# Patient Record
Sex: FEMALE | Race: OTHER | Employment: UNEMPLOYED | URBAN - METROPOLITAN AREA
[De-identification: names, ages, dates, MRNs, and addresses within clinical notes are randomized per-mention and may not be internally consistent; named-entity substitution may affect disease eponyms.]

---

## 2017-01-03 ENCOUNTER — HOSPITAL ENCOUNTER (OUTPATIENT)
Dept: ULTRASOUND IMAGING | Age: 42
Discharge: HOME | End: 2017-01-03

## 2017-01-03 DIAGNOSIS — Z34.82 PRENATAL CARE, SUBSEQUENT PREGNANCY, SECOND TRIMESTER: ICD-10-CM

## 2017-01-05 ENCOUNTER — ROUTINE PRENATAL (OUTPATIENT)
Dept: OBGYN CLINIC | Age: 42
End: 2017-01-05

## 2017-01-05 VITALS
HEART RATE: 75 BPM | SYSTOLIC BLOOD PRESSURE: 122 MMHG | WEIGHT: 126 LBS | BODY MASS INDEX: 23.05 KG/M2 | DIASTOLIC BLOOD PRESSURE: 66 MMHG

## 2017-01-05 DIAGNOSIS — O09.30 LATE PRENATAL CARE: ICD-10-CM

## 2017-01-05 DIAGNOSIS — Z34.83 PRENATAL CARE, SUBSEQUENT PREGNANCY, THIRD TRIMESTER: Primary | ICD-10-CM

## 2017-01-05 PROCEDURE — 99212 OFFICE O/P EST SF 10 MIN: CPT | Performed by: OBSTETRICS & GYNECOLOGY

## 2017-01-10 ENCOUNTER — HOSPITAL ENCOUNTER (OUTPATIENT)
Dept: OTHER | Age: 42
Discharge: HOME | End: 2017-01-10
Attending: OBSTETRICS & GYNECOLOGY | Admitting: OBSTETRICS & GYNECOLOGY

## 2017-01-10 DIAGNOSIS — O99.810 ABNORMAL GLUCOSE COMPLICATING PREGNANCY: ICD-10-CM

## 2017-01-12 ENCOUNTER — ROUTINE PRENATAL (OUTPATIENT)
Dept: OBGYN CLINIC | Age: 42
End: 2017-01-12

## 2017-01-12 VITALS
WEIGHT: 132 LBS | HEART RATE: 72 BPM | BODY MASS INDEX: 24.14 KG/M2 | DIASTOLIC BLOOD PRESSURE: 83 MMHG | SYSTOLIC BLOOD PRESSURE: 134 MMHG

## 2017-01-12 DIAGNOSIS — Z34.83 PRENATAL CARE, SUBSEQUENT PREGNANCY, THIRD TRIMESTER: Primary | ICD-10-CM

## 2017-01-12 DIAGNOSIS — Z64.1 GRAND MULTIPARITY: ICD-10-CM

## 2017-01-12 PROCEDURE — 90471 IMMUNIZATION ADMIN: CPT | Performed by: OBSTETRICS & GYNECOLOGY

## 2017-01-12 PROCEDURE — 90715 TDAP VACCINE 7 YRS/> IM: CPT | Performed by: OBSTETRICS & GYNECOLOGY

## 2017-01-12 PROCEDURE — 99212 OFFICE O/P EST SF 10 MIN: CPT | Performed by: OBSTETRICS & GYNECOLOGY

## 2017-01-12 RX ORDER — LANOLIN ALCOHOL/MO/W.PET/CERES
325 CREAM (GRAM) TOPICAL 2 TIMES DAILY
Qty: 90 TABLET | Refills: 3 | Status: SHIPPED | OUTPATIENT
Start: 2017-01-12 | End: 2018-02-09 | Stop reason: SDUPTHER

## 2017-01-15 LAB — GROUP B STREP CULTURE: NORMAL

## 2017-01-28 PROBLEM — O47.9 THREATENED LABOR AT TERM: Status: ACTIVE | Noted: 2017-01-28

## 2017-12-29 ENCOUNTER — INITIAL PRENATAL (OUTPATIENT)
Dept: OBGYN CLINIC | Age: 42
End: 2017-12-29

## 2017-12-29 ENCOUNTER — HOSPITAL ENCOUNTER (OUTPATIENT)
Dept: OTHER | Age: 42
Discharge: HOME | End: 2017-12-31

## 2017-12-29 VITALS
SYSTOLIC BLOOD PRESSURE: 106 MMHG | DIASTOLIC BLOOD PRESSURE: 65 MMHG | HEART RATE: 84 BPM | WEIGHT: 117.8 LBS | BODY MASS INDEX: 21.55 KG/M2

## 2017-12-29 DIAGNOSIS — Z34.82 PRENATAL CARE, SUBSEQUENT PREGNANCY, SECOND TRIMESTER: Primary | ICD-10-CM

## 2017-12-29 DIAGNOSIS — O09.32 LATE PRENATAL CARE AFFECTING PREGNANCY IN SECOND TRIMESTER: ICD-10-CM

## 2017-12-29 PROBLEM — O47.9 THREATENED LABOR AT TERM: Status: RESOLVED | Noted: 2017-01-28 | Resolved: 2017-12-29

## 2017-12-29 LAB
ABO/RH: NORMAL
ANTIBODY SCREEN: NORMAL
HEPATITIS C ANTIBODY INTERPRETATION: NORMAL

## 2017-12-29 PROCEDURE — 90471 IMMUNIZATION ADMIN: CPT | Performed by: OBSTETRICS & GYNECOLOGY

## 2017-12-29 PROCEDURE — 90674 CCIIV4 VAC NO PRSV 0.5 ML IM: CPT | Performed by: OBSTETRICS & GYNECOLOGY

## 2017-12-29 PROCEDURE — 99215 OFFICE O/P EST HI 40 MIN: CPT | Performed by: OBSTETRICS & GYNECOLOGY

## 2017-12-29 RX ORDER — VITAMIN A, VITAMIN C, VITAMIN D-3, VITAMIN E, VITAMIN B-1, VITAMIN B-2, NIACIN, VITAMIN B-6, CALCIUM, IRON, ZINC, COPPER 4000; 120; 400; 22; 1.84; 3; 20; 10; 1; 12; 200; 27; 25; 2 [IU]/1; MG/1; [IU]/1; MG/1; MG/1; MG/1; MG/1; MG/1; MG/1; UG/1; MG/1; MG/1; MG/1; MG/1
1 TABLET ORAL DAILY
Qty: 30 TABLET | Refills: 11 | Status: ON HOLD | OUTPATIENT
Start: 2017-12-29 | End: 2018-04-18

## 2017-12-30 LAB
BASOPHILS ABSOLUTE: 0 K/UL (ref 0–0.2)
BASOPHILS RELATIVE PERCENT: 0.3 %
EOSINOPHILS ABSOLUTE: 0 K/UL (ref 0–0.6)
EOSINOPHILS RELATIVE PERCENT: 0.2 %
HCT VFR BLD CALC: 30.6 % (ref 36–48)
HEMOGLOBIN: 9.8 G/DL (ref 12–16)
HEPATITIS B SURFACE ANTIGEN INTERPRETATION: ABNORMAL
LYMPHOCYTES ABSOLUTE: 1.8 K/UL (ref 1–5.1)
LYMPHOCYTES RELATIVE PERCENT: 16.5 %
MCH RBC QN AUTO: 27.1 PG (ref 26–34)
MCHC RBC AUTO-ENTMCNC: 32 G/DL (ref 31–36)
MCV RBC AUTO: 84.8 FL (ref 80–100)
MONOCYTES ABSOLUTE: 0.6 K/UL (ref 0–1.3)
MONOCYTES RELATIVE PERCENT: 5.4 %
NEUTROPHILS ABSOLUTE: 8.2 K/UL (ref 1.7–7.7)
NEUTROPHILS RELATIVE PERCENT: 77.6 %
PDW BLD-RTO: 13.2 % (ref 12.4–15.4)
PLATELET # BLD: 162 K/UL (ref 135–450)
PMV BLD AUTO: 10.9 FL (ref 5–10.5)
RBC # BLD: 3.61 M/UL (ref 4–5.2)
RPR: ABNORMAL
RUBELLA ANTIBODY IGG: 5.9 IU/ML
WBC # BLD: 10.6 K/UL (ref 4–11)

## 2017-12-31 LAB
HIV-1 AND HIV-2 ANTIBODIES: NORMAL
URINE CULTURE, ROUTINE: NORMAL

## 2018-01-01 ENCOUNTER — HOSPITAL ENCOUNTER (OUTPATIENT)
Dept: OTHER | Age: 43
Discharge: HOME | End: 2018-01-31

## 2018-01-05 LAB
C. TRACHOMATIS DNA,THIN PREP: NEGATIVE
N. GONORRHOEAE DNA, THIN PREP: NEGATIVE

## 2018-01-08 ENCOUNTER — HOSPITAL ENCOUNTER (OUTPATIENT)
Dept: ULTRASOUND IMAGING | Age: 43
Discharge: HOME | End: 2018-01-08

## 2018-01-08 DIAGNOSIS — O09.522 ELDERLY MULTIGRAVIDA IN SECOND TRIMESTER: ICD-10-CM

## 2018-01-08 DIAGNOSIS — Z34.82 PRENATAL CARE, SUBSEQUENT PREGNANCY, SECOND TRIMESTER: ICD-10-CM

## 2018-01-12 ENCOUNTER — ROUTINE PRENATAL (OUTPATIENT)
Dept: OBGYN CLINIC | Age: 43
End: 2018-01-12

## 2018-01-12 VITALS
WEIGHT: 119 LBS | BODY MASS INDEX: 21.77 KG/M2 | HEART RATE: 76 BPM | SYSTOLIC BLOOD PRESSURE: 99 MMHG | DIASTOLIC BLOOD PRESSURE: 51 MMHG

## 2018-01-12 DIAGNOSIS — Z34.92 PRENATAL CARE IN SECOND TRIMESTER: ICD-10-CM

## 2018-01-12 DIAGNOSIS — Z34.82 PRENATAL CARE, SUBSEQUENT PREGNANCY, SECOND TRIMESTER: Primary | ICD-10-CM

## 2018-01-12 PROCEDURE — 99212 OFFICE O/P EST SF 10 MIN: CPT | Performed by: OBSTETRICS & GYNECOLOGY

## 2018-01-12 NOTE — PROGRESS NOTES
Pt with no c/o today; states baby is active. Patient denies vaginal bleeding and leaking of fluid/ctx. Glucola for 1hr GCT given and testing explained. Pt denies any questions and states understanding of test. Patient given order to be drawn next week due to ill child. Patient verbalized understanding through .

## 2018-02-01 ENCOUNTER — HOSPITAL ENCOUNTER (OUTPATIENT)
Dept: OTHER | Age: 43
Discharge: HOME | End: 2018-02-28

## 2018-02-09 ENCOUNTER — ROUTINE PRENATAL (OUTPATIENT)
Dept: OBGYN CLINIC | Age: 43
End: 2018-02-09

## 2018-02-09 VITALS
WEIGHT: 121.6 LBS | DIASTOLIC BLOOD PRESSURE: 55 MMHG | SYSTOLIC BLOOD PRESSURE: 103 MMHG | HEART RATE: 86 BPM | BODY MASS INDEX: 22.24 KG/M2

## 2018-02-09 DIAGNOSIS — Z64.1 GRAND MULTIPARITY: ICD-10-CM

## 2018-02-09 PROCEDURE — 99212 OFFICE O/P EST SF 10 MIN: CPT | Performed by: OBSTETRICS & GYNECOLOGY

## 2018-02-09 RX ORDER — LANOLIN ALCOHOL/MO/W.PET/CERES
325 CREAM (GRAM) TOPICAL 2 TIMES DAILY
Qty: 90 TABLET | Refills: 3 | Status: SHIPPED | OUTPATIENT
Start: 2018-02-09 | End: 2018-03-20 | Stop reason: SDUPTHER

## 2018-02-12 ENCOUNTER — TELEPHONE (OUTPATIENT)
Dept: OBGYN CLINIC | Age: 43
End: 2018-02-12

## 2018-02-12 LAB
GLUCOSE CHALLENGE: 128 MG/DL
HCT VFR BLD CALC: 29 % (ref 36–48)
HEMOGLOBIN: 9.4 G/DL (ref 12–16)
MCH RBC QN AUTO: 26.6 PG (ref 26–34)
MCHC RBC AUTO-ENTMCNC: 32.5 G/DL (ref 31–36)
MCV RBC AUTO: 81.7 FL (ref 80–100)
PDW BLD-RTO: 17.7 % (ref 12.4–15.4)
PLATELET # BLD: 161 K/UL (ref 135–450)
PMV BLD AUTO: 9.7 FL (ref 5–10.5)
RBC # BLD: 3.55 M/UL (ref 4–5.2)
WBC # BLD: 10.4 K/UL (ref 4–11)

## 2018-02-13 LAB — RPR: NORMAL

## 2018-02-16 ENCOUNTER — TELEPHONE (OUTPATIENT)
Dept: OBGYN CLINIC | Age: 43
End: 2018-02-16

## 2018-02-27 LAB
HEMOGLOBIN ELECTROPHORESIS: NORMAL
HGB ELECTROPHORESIS INTERP: NORMAL

## 2018-03-01 ENCOUNTER — HOSPITAL ENCOUNTER (OUTPATIENT)
Dept: OTHER | Age: 43
Discharge: HOME | End: 2018-03-31

## 2018-03-06 ENCOUNTER — ROUTINE PRENATAL (OUTPATIENT)
Dept: OBGYN CLINIC | Age: 43
End: 2018-03-06

## 2018-03-06 VITALS
SYSTOLIC BLOOD PRESSURE: 101 MMHG | WEIGHT: 126.2 LBS | BODY MASS INDEX: 23.08 KG/M2 | DIASTOLIC BLOOD PRESSURE: 53 MMHG | HEART RATE: 83 BPM

## 2018-03-06 DIAGNOSIS — Z34.83 PRENATAL CARE, SUBSEQUENT PREGNANCY, THIRD TRIMESTER: Primary | ICD-10-CM

## 2018-03-06 DIAGNOSIS — O09.523 ELDERLY MULTIGRAVIDA IN THIRD TRIMESTER: ICD-10-CM

## 2018-03-06 PROBLEM — O09.32 LATE PRENATAL CARE AFFECTING PREGNANCY IN SECOND TRIMESTER: Status: RESOLVED | Noted: 2017-12-29 | Resolved: 2018-03-06

## 2018-03-06 PROCEDURE — 99212 OFFICE O/P EST SF 10 MIN: CPT | Performed by: OBSTETRICS & GYNECOLOGY

## 2018-03-06 NOTE — PROGRESS NOTES
Reports + fetal movement, denies bleeding, leaking of fluid. C/o vaginal pressure and cramping every day.  Reviewed signs and symptoms of PTL, when to come to hospital.

## 2018-03-12 ENCOUNTER — HOSPITAL ENCOUNTER (OUTPATIENT)
Dept: ULTRASOUND IMAGING | Age: 43
Discharge: HOME | End: 2018-03-13

## 2018-03-12 DIAGNOSIS — O09.523 ELDERLY MULTIGRAVIDA IN THIRD TRIMESTER: ICD-10-CM

## 2018-03-12 DIAGNOSIS — O09.523 SUPERVISION OF ELDERLY MULTIGRAVIDA IN THIRD TRIMESTER: ICD-10-CM

## 2018-03-13 ENCOUNTER — ROUTINE PRENATAL (OUTPATIENT)
Dept: OBGYN CLINIC | Age: 43
End: 2018-03-13

## 2018-03-13 VITALS
DIASTOLIC BLOOD PRESSURE: 58 MMHG | HEART RATE: 80 BPM | SYSTOLIC BLOOD PRESSURE: 116 MMHG | BODY MASS INDEX: 23.59 KG/M2 | WEIGHT: 129 LBS

## 2018-03-13 DIAGNOSIS — Z34.93 PRENATAL CARE IN THIRD TRIMESTER: Primary | ICD-10-CM

## 2018-03-13 PROCEDURE — 99212 OFFICE O/P EST SF 10 MIN: CPT | Performed by: OBSTETRICS & GYNECOLOGY

## 2018-03-17 PROBLEM — R10.9 ABDOMINAL PAIN: Status: ACTIVE | Noted: 2018-03-17

## 2018-03-20 ENCOUNTER — ROUTINE PRENATAL (OUTPATIENT)
Dept: OBGYN CLINIC | Age: 43
End: 2018-03-20

## 2018-03-20 VITALS
HEART RATE: 83 BPM | DIASTOLIC BLOOD PRESSURE: 66 MMHG | WEIGHT: 131 LBS | BODY MASS INDEX: 23.96 KG/M2 | SYSTOLIC BLOOD PRESSURE: 113 MMHG

## 2018-03-20 DIAGNOSIS — O09.523 ELDERLY MULTIGRAVIDA IN THIRD TRIMESTER: Primary | ICD-10-CM

## 2018-03-20 DIAGNOSIS — Z64.1 GRAND MULTIPARITY: ICD-10-CM

## 2018-03-20 PROCEDURE — 99213 OFFICE O/P EST LOW 20 MIN: CPT | Performed by: OBSTETRICS & GYNECOLOGY

## 2018-03-20 RX ORDER — LANOLIN ALCOHOL/MO/W.PET/CERES
325 CREAM (GRAM) TOPICAL 2 TIMES DAILY
Qty: 60 TABLET | Refills: 3 | Status: SHIPPED | OUTPATIENT
Start: 2018-03-20 | End: 2019-01-15 | Stop reason: HOSPADM

## 2018-03-27 ENCOUNTER — ROUTINE PRENATAL (OUTPATIENT)
Dept: OBGYN CLINIC | Age: 43
End: 2018-03-27

## 2018-03-27 VITALS
DIASTOLIC BLOOD PRESSURE: 64 MMHG | BODY MASS INDEX: 24.33 KG/M2 | WEIGHT: 133 LBS | HEART RATE: 70 BPM | SYSTOLIC BLOOD PRESSURE: 108 MMHG

## 2018-03-27 DIAGNOSIS — Z34.83 PRENATAL CARE, SUBSEQUENT PREGNANCY, THIRD TRIMESTER: Primary | ICD-10-CM

## 2018-03-27 PROCEDURE — 99212 OFFICE O/P EST SF 10 MIN: CPT | Performed by: OBSTETRICS & GYNECOLOGY

## 2018-03-30 LAB — GROUP B STREP CULTURE: NORMAL

## 2018-04-01 ENCOUNTER — HOSPITAL ENCOUNTER (OUTPATIENT)
Dept: OTHER | Age: 43
Discharge: HOME | End: 2018-04-30

## 2018-04-03 ENCOUNTER — HOSPITAL ENCOUNTER (OUTPATIENT)
Dept: ULTRASOUND IMAGING | Age: 43
Discharge: HOME | End: 2018-04-04

## 2018-04-03 ENCOUNTER — ROUTINE PRENATAL (OUTPATIENT)
Dept: OBGYN CLINIC | Age: 43
End: 2018-04-03

## 2018-04-03 VITALS
WEIGHT: 134 LBS | DIASTOLIC BLOOD PRESSURE: 65 MMHG | HEART RATE: 72 BPM | BODY MASS INDEX: 24.51 KG/M2 | SYSTOLIC BLOOD PRESSURE: 112 MMHG

## 2018-04-03 DIAGNOSIS — O09.523 SUPERVISION OF ELDERLY MULTIGRAVIDA IN THIRD TRIMESTER: ICD-10-CM

## 2018-04-03 DIAGNOSIS — Z34.93 PRENATAL CARE IN THIRD TRIMESTER: Primary | ICD-10-CM

## 2018-04-03 DIAGNOSIS — O09.523 ELDERLY MULTIGRAVIDA IN THIRD TRIMESTER: ICD-10-CM

## 2018-04-03 PROCEDURE — 99212 OFFICE O/P EST SF 10 MIN: CPT | Performed by: OBSTETRICS & GYNECOLOGY

## 2018-04-10 ENCOUNTER — ROUTINE PRENATAL (OUTPATIENT)
Dept: OBGYN CLINIC | Age: 43
End: 2018-04-10

## 2018-04-10 VITALS
DIASTOLIC BLOOD PRESSURE: 61 MMHG | HEART RATE: 77 BPM | WEIGHT: 134 LBS | BODY MASS INDEX: 24.51 KG/M2 | SYSTOLIC BLOOD PRESSURE: 114 MMHG

## 2018-04-10 DIAGNOSIS — Z34.93 PRENATAL CARE IN THIRD TRIMESTER: Primary | ICD-10-CM

## 2018-04-10 PROBLEM — O28.8 NST (NON-STRESS TEST) NONREACTIVE: Status: ACTIVE | Noted: 2018-04-10

## 2018-04-10 PROCEDURE — 99212 OFFICE O/P EST SF 10 MIN: CPT | Performed by: OBSTETRICS & GYNECOLOGY

## 2018-04-17 ENCOUNTER — ROUTINE PRENATAL (OUTPATIENT)
Dept: OBGYN CLINIC | Age: 43
End: 2018-04-17

## 2018-04-17 VITALS
WEIGHT: 134.6 LBS | BODY MASS INDEX: 24.62 KG/M2 | DIASTOLIC BLOOD PRESSURE: 67 MMHG | SYSTOLIC BLOOD PRESSURE: 109 MMHG | HEART RATE: 76 BPM

## 2018-04-17 DIAGNOSIS — Z34.83 PRENATAL CARE, SUBSEQUENT PREGNANCY, THIRD TRIMESTER: Primary | ICD-10-CM

## 2018-04-17 DIAGNOSIS — Z34.93 PRENATAL CARE IN THIRD TRIMESTER: ICD-10-CM

## 2018-04-17 PROCEDURE — 99212 OFFICE O/P EST SF 10 MIN: CPT | Performed by: OBSTETRICS & GYNECOLOGY

## 2018-04-18 PROBLEM — Z37.9 NORMAL LABOR: Status: ACTIVE | Noted: 2018-04-18

## 2018-05-01 ENCOUNTER — HOSPITAL ENCOUNTER (OUTPATIENT)
Dept: OTHER | Age: 43
Discharge: HOME | End: 2018-05-31

## 2018-05-29 ENCOUNTER — POSTPARTUM VISIT (OUTPATIENT)
Dept: OBGYN CLINIC | Age: 43
End: 2018-05-29

## 2018-05-29 VITALS
HEART RATE: 62 BPM | DIASTOLIC BLOOD PRESSURE: 61 MMHG | WEIGHT: 123.8 LBS | BODY MASS INDEX: 22.64 KG/M2 | SYSTOLIC BLOOD PRESSURE: 96 MMHG

## 2018-05-29 PROCEDURE — 99214 OFFICE O/P EST MOD 30 MIN: CPT | Performed by: OBSTETRICS & GYNECOLOGY

## 2018-06-01 ENCOUNTER — HOSPITAL ENCOUNTER (OUTPATIENT)
Dept: OTHER | Age: 43
Discharge: HOME | End: 2018-06-30

## 2018-12-04 ENCOUNTER — HOSPITAL ENCOUNTER (OUTPATIENT)
Age: 43
Discharge: HOME | End: 2018-12-04

## 2018-12-04 ENCOUNTER — INITIAL PRENATAL (OUTPATIENT)
Dept: OBGYN | Age: 43
End: 2018-12-04

## 2018-12-04 VITALS
DIASTOLIC BLOOD PRESSURE: 68 MMHG | WEIGHT: 134 LBS | BODY MASS INDEX: 24.51 KG/M2 | HEART RATE: 86 BPM | SYSTOLIC BLOOD PRESSURE: 112 MMHG

## 2018-12-04 DIAGNOSIS — Z34.82 PRENATAL CARE, SUBSEQUENT PREGNANCY, SECOND TRIMESTER: ICD-10-CM

## 2018-12-04 DIAGNOSIS — Z34.82 PRENATAL CARE, SUBSEQUENT PREGNANCY, SECOND TRIMESTER: Primary | ICD-10-CM

## 2018-12-04 LAB
ABO/RH: NORMAL
ANTIBODY SCREEN: NORMAL
HEPATITIS C ANTIBODY INTERPRETATION: NORMAL

## 2018-12-04 PROCEDURE — 87086 URINE CULTURE/COLONY COUNT: CPT

## 2018-12-04 PROCEDURE — 86803 HEPATITIS C AB TEST: CPT

## 2018-12-04 PROCEDURE — 86900 BLOOD TYPING SEROLOGIC ABO: CPT

## 2018-12-04 PROCEDURE — 87491 CHLMYD TRACH DNA AMP PROBE: CPT

## 2018-12-04 PROCEDURE — 86702 HIV-2 ANTIBODY: CPT

## 2018-12-04 PROCEDURE — 87591 N.GONORRHOEAE DNA AMP PROB: CPT

## 2018-12-04 PROCEDURE — 86901 BLOOD TYPING SEROLOGIC RH(D): CPT

## 2018-12-04 PROCEDURE — 88175 CYTOPATH C/V AUTO FLUID REDO: CPT

## 2018-12-04 PROCEDURE — 36415 COLL VENOUS BLD VENIPUNCTURE: CPT

## 2018-12-04 PROCEDURE — 86850 RBC ANTIBODY SCREEN: CPT

## 2018-12-04 PROCEDURE — 86701 HIV-1ANTIBODY: CPT

## 2018-12-04 PROCEDURE — 85025 COMPLETE CBC W/AUTO DIFF WBC: CPT

## 2018-12-04 PROCEDURE — 86762 RUBELLA ANTIBODY: CPT

## 2018-12-04 PROCEDURE — 87340 HEPATITIS B SURFACE AG IA: CPT

## 2018-12-04 PROCEDURE — 86780 TREPONEMA PALLIDUM: CPT

## 2018-12-04 PROCEDURE — 87390 HIV-1 AG IA: CPT

## 2018-12-04 PROCEDURE — 99214 OFFICE O/P EST MOD 30 MIN: CPT | Performed by: OBSTETRICS & GYNECOLOGY

## 2018-12-04 RX ORDER — VITAMIN A, VITAMIN C, VITAMIN D-3, VITAMIN E, VITAMIN B-1, VITAMIN B-2, NIACIN, VITAMIN B-6, CALCIUM, IRON, ZINC, COPPER 4000; 120; 400; 22; 1.84; 3; 20; 10; 1; 12; 200; 27; 25; 2 [IU]/1; MG/1; [IU]/1; MG/1; MG/1; MG/1; MG/1; MG/1; MG/1; UG/1; MG/1; MG/1; MG/1; MG/1
1 TABLET ORAL DAILY
Qty: 30 TABLET | Refills: 11 | Status: SHIPPED | OUTPATIENT
Start: 2018-12-04 | End: 2019-06-21

## 2018-12-05 LAB
BASOPHILS ABSOLUTE: 0 K/UL (ref 0–0.2)
BASOPHILS RELATIVE PERCENT: 0.3 %
EOSINOPHILS ABSOLUTE: 0 K/UL (ref 0–0.6)
EOSINOPHILS RELATIVE PERCENT: 0.2 %
HCT VFR BLD CALC: 33.5 % (ref 36–48)
HEMOGLOBIN: 11.2 G/DL (ref 12–16)
HEPATITIS B SURFACE ANTIGEN INTERPRETATION: ABNORMAL
HIV AG/AB: NORMAL
HIV ANTIGEN: NORMAL
HIV-1 ANTIBODY: NORMAL
HIV-2 AB: NORMAL
LYMPHOCYTES ABSOLUTE: 1.6 K/UL (ref 1–5.1)
LYMPHOCYTES RELATIVE PERCENT: 16.1 %
MCH RBC QN AUTO: 30.5 PG (ref 26–34)
MCHC RBC AUTO-ENTMCNC: 33.6 G/DL (ref 31–36)
MCV RBC AUTO: 90.9 FL (ref 80–100)
MONOCYTES ABSOLUTE: 0.5 K/UL (ref 0–1.3)
MONOCYTES RELATIVE PERCENT: 4.9 %
NEUTROPHILS ABSOLUTE: 7.7 K/UL (ref 1.7–7.7)
NEUTROPHILS RELATIVE PERCENT: 78.5 %
PDW BLD-RTO: 12.7 % (ref 12.4–15.4)
PLATELET # BLD: 160 K/UL (ref 135–450)
PMV BLD AUTO: 10.6 FL (ref 5–10.5)
RBC # BLD: 3.68 M/UL (ref 4–5.2)
RUBELLA ANTIBODY IGG: 16.5 IU/ML
TOTAL SYPHILLIS IGG/IGM: ABNORMAL
WBC # BLD: 9.8 K/UL (ref 4–11)

## 2018-12-06 LAB — URINE CULTURE, ROUTINE: NORMAL

## 2018-12-07 LAB
C. TRACHOMATIS DNA,THIN PREP: NEGATIVE
N. GONORRHOEAE DNA, THIN PREP: NEGATIVE

## 2018-12-14 ENCOUNTER — HOSPITAL ENCOUNTER (OUTPATIENT)
Dept: ULTRASOUND IMAGING | Age: 43
Discharge: HOME | End: 2018-12-14

## 2018-12-14 DIAGNOSIS — Z34.82 PRENATAL CARE, SUBSEQUENT PREGNANCY, SECOND TRIMESTER: ICD-10-CM

## 2018-12-14 PROCEDURE — 76805 OB US >/= 14 WKS SNGL FETUS: CPT

## 2018-12-18 ENCOUNTER — HOSPITAL ENCOUNTER (OUTPATIENT)
Age: 43
Discharge: HOME | End: 2018-12-18

## 2018-12-18 ENCOUNTER — ROUTINE PRENATAL (OUTPATIENT)
Dept: OBGYN | Age: 43
End: 2018-12-18

## 2018-12-18 VITALS
DIASTOLIC BLOOD PRESSURE: 58 MMHG | HEART RATE: 85 BPM | SYSTOLIC BLOOD PRESSURE: 99 MMHG | WEIGHT: 138 LBS | BODY MASS INDEX: 25.24 KG/M2

## 2018-12-18 DIAGNOSIS — O09.32 PRENATAL CARE INSUFFICIENT, SECOND TRIMESTER: Primary | ICD-10-CM

## 2018-12-18 LAB
GLUCOSE CHALLENGE: 111 MG/DL
HCT VFR BLD CALC: 31.1 % (ref 36–48)
HEMOGLOBIN: 10.5 G/DL (ref 12–16)
MCH RBC QN AUTO: 30.8 PG (ref 26–34)
MCHC RBC AUTO-ENTMCNC: 33.8 G/DL (ref 31–36)
MCV RBC AUTO: 91.1 FL (ref 80–100)
PDW BLD-RTO: 12.9 % (ref 12.4–15.4)
PLATELET # BLD: 148 K/UL (ref 135–450)
PMV BLD AUTO: 10.8 FL (ref 5–10.5)
RBC # BLD: 3.42 M/UL (ref 4–5.2)
WBC # BLD: 9.9 K/UL (ref 4–11)

## 2018-12-18 PROCEDURE — 36415 COLL VENOUS BLD VENIPUNCTURE: CPT

## 2018-12-18 PROCEDURE — 99212 OFFICE O/P EST SF 10 MIN: CPT | Performed by: OBSTETRICS & GYNECOLOGY

## 2018-12-18 PROCEDURE — 85027 COMPLETE CBC AUTOMATED: CPT

## 2018-12-18 PROCEDURE — 82950 GLUCOSE TEST: CPT

## 2019-01-15 ENCOUNTER — ROUTINE PRENATAL (OUTPATIENT)
Dept: OBGYN | Age: 44
End: 2019-01-15

## 2019-01-15 VITALS
WEIGHT: 138 LBS | HEART RATE: 86 BPM | SYSTOLIC BLOOD PRESSURE: 104 MMHG | BODY MASS INDEX: 25.24 KG/M2 | DIASTOLIC BLOOD PRESSURE: 57 MMHG

## 2019-01-15 DIAGNOSIS — Z34.93 PRENATAL CARE IN THIRD TRIMESTER: Primary | ICD-10-CM

## 2019-01-15 DIAGNOSIS — O99.013 ANEMIA AFFECTING PREGNANCY IN THIRD TRIMESTER: ICD-10-CM

## 2019-01-15 PROCEDURE — 99212 OFFICE O/P EST SF 10 MIN: CPT | Performed by: OBSTETRICS & GYNECOLOGY

## 2019-01-15 RX ORDER — FERROUS SULFATE 325(65) MG
325 TABLET ORAL DAILY
Qty: 30 TABLET | Refills: 5 | Status: SHIPPED | OUTPATIENT
Start: 2019-01-15 | End: 2019-06-21

## 2019-02-12 ENCOUNTER — ROUTINE PRENATAL (OUTPATIENT)
Dept: OBGYN | Age: 44
End: 2019-02-12

## 2019-02-12 VITALS
DIASTOLIC BLOOD PRESSURE: 64 MMHG | SYSTOLIC BLOOD PRESSURE: 108 MMHG | WEIGHT: 140 LBS | BODY MASS INDEX: 25.61 KG/M2 | HEART RATE: 85 BPM

## 2019-02-12 DIAGNOSIS — Z34.93 PRENATAL CARE IN THIRD TRIMESTER: Primary | ICD-10-CM

## 2019-02-12 LAB
BILIRUBIN URINE: NEGATIVE MG/DL
BLOOD, URINE: NEGATIVE
CLARITY: CLEAR
COLOR: YELLOW
GLUCOSE URINE: NEGATIVE MG/DL
KETONES, URINE: ABNORMAL MG/DL
LEUKOCYTE ESTERASE, URINE: NEGATIVE
NITRITE, URINE: NEGATIVE
PH UA: 6
PROTEIN UA: 30 MG/DL
SPECIFIC GRAVITY UA: >=1.03
UROBILINOGEN, URINE: 0.2 E.U./DL

## 2019-02-12 PROCEDURE — 99212 OFFICE O/P EST SF 10 MIN: CPT | Performed by: OBSTETRICS & GYNECOLOGY

## 2019-02-12 PROCEDURE — 81003 URINALYSIS AUTO W/O SCOPE: CPT

## 2019-02-19 ENCOUNTER — ROUTINE PRENATAL (OUTPATIENT)
Dept: OBGYN | Age: 44
End: 2019-02-19

## 2019-02-19 ENCOUNTER — HOSPITAL ENCOUNTER (OUTPATIENT)
Age: 44
Discharge: HOME | End: 2019-02-19
Attending: OBSTETRICS & GYNECOLOGY | Admitting: OBSTETRICS & GYNECOLOGY

## 2019-02-19 VITALS
HEART RATE: 77 BPM | TEMPERATURE: 97.2 F | DIASTOLIC BLOOD PRESSURE: 51 MMHG | RESPIRATION RATE: 20 BRPM | SYSTOLIC BLOOD PRESSURE: 101 MMHG

## 2019-02-19 VITALS
BODY MASS INDEX: 25.79 KG/M2 | SYSTOLIC BLOOD PRESSURE: 98 MMHG | WEIGHT: 141 LBS | DIASTOLIC BLOOD PRESSURE: 60 MMHG | HEART RATE: 81 BPM

## 2019-02-19 DIAGNOSIS — Z34.93 PRENATAL CARE IN THIRD TRIMESTER: Primary | ICD-10-CM

## 2019-02-19 PROCEDURE — 6360000002 HC RX W HCPCS: Performed by: OBSTETRICS & GYNECOLOGY

## 2019-02-19 PROCEDURE — 90715 TDAP VACCINE 7 YRS/> IM: CPT | Performed by: OBSTETRICS & GYNECOLOGY

## 2019-02-19 PROCEDURE — 59025 FETAL NON-STRESS TEST: CPT

## 2019-02-19 PROCEDURE — 99212 OFFICE O/P EST SF 10 MIN: CPT | Performed by: OBSTETRICS & GYNECOLOGY

## 2019-02-19 PROCEDURE — 90471 IMMUNIZATION ADMIN: CPT | Performed by: OBSTETRICS & GYNECOLOGY

## 2019-02-19 RX ADMIN — TETANUS TOXOID, REDUCED DIPHTHERIA TOXOID AND ACELLULAR PERTUSSIS VACCINE, ADSORBED 0.5 ML: 5; 2.5; 8; 8; 2.5 SUSPENSION INTRAMUSCULAR at 15:49

## 2019-02-26 ENCOUNTER — HOSPITAL ENCOUNTER (OUTPATIENT)
Age: 44
Discharge: HOME | End: 2019-02-26
Attending: OBSTETRICS & GYNECOLOGY | Admitting: OBSTETRICS & GYNECOLOGY

## 2019-02-26 VITALS
RESPIRATION RATE: 18 BRPM | HEART RATE: 94 BPM | DIASTOLIC BLOOD PRESSURE: 55 MMHG | SYSTOLIC BLOOD PRESSURE: 102 MMHG | TEMPERATURE: 97.2 F

## 2019-02-26 PROCEDURE — 59025 FETAL NON-STRESS TEST: CPT

## 2019-03-05 ENCOUNTER — ROUTINE PRENATAL (OUTPATIENT)
Dept: OBGYN | Age: 44
End: 2019-03-05

## 2019-03-05 ENCOUNTER — HOSPITAL ENCOUNTER (OUTPATIENT)
Age: 44
Discharge: HOME | End: 2019-03-05
Attending: OBSTETRICS & GYNECOLOGY | Admitting: OBSTETRICS & GYNECOLOGY

## 2019-03-05 VITALS
DIASTOLIC BLOOD PRESSURE: 56 MMHG | BODY MASS INDEX: 26.81 KG/M2 | WEIGHT: 142 LBS | TEMPERATURE: 97.2 F | HEART RATE: 71 BPM | SYSTOLIC BLOOD PRESSURE: 103 MMHG | HEIGHT: 61 IN | RESPIRATION RATE: 16 BRPM

## 2019-03-05 VITALS
BODY MASS INDEX: 25.97 KG/M2 | DIASTOLIC BLOOD PRESSURE: 56 MMHG | WEIGHT: 142 LBS | HEART RATE: 85 BPM | SYSTOLIC BLOOD PRESSURE: 96 MMHG

## 2019-03-05 DIAGNOSIS — Z34.93 PRENATAL CARE IN THIRD TRIMESTER: ICD-10-CM

## 2019-03-05 DIAGNOSIS — Z34.83 PRENATAL CARE, SUBSEQUENT PREGNANCY, THIRD TRIMESTER: Primary | ICD-10-CM

## 2019-03-05 PROCEDURE — 99211 OFF/OP EST MAY X REQ PHY/QHP: CPT

## 2019-03-05 PROCEDURE — 99212 OFFICE O/P EST SF 10 MIN: CPT | Performed by: OBSTETRICS & GYNECOLOGY

## 2019-03-05 PROCEDURE — 81003 URINALYSIS AUTO W/O SCOPE: CPT

## 2019-03-05 PROCEDURE — 87081 CULTURE SCREEN ONLY: CPT

## 2019-03-05 PROCEDURE — 59025 FETAL NON-STRESS TEST: CPT

## 2019-03-06 LAB
BILIRUBIN URINE: NEGATIVE MG/DL
BLOOD, URINE: NEGATIVE
CLARITY: CLEAR
COLOR: YELLOW
GLUCOSE URINE: NEGATIVE MG/DL
KETONES, URINE: NEGATIVE MG/DL
LEUKOCYTE ESTERASE, URINE: ABNORMAL
NITRITE, URINE: POSITIVE
PH UA: 5.5 (ref 5–8)
PROTEIN UA: ABNORMAL MG/DL
SPECIFIC GRAVITY UA: 1.02 (ref 1–1.03)
UROBILINOGEN, URINE: 0.2 E.U./DL

## 2019-03-08 LAB — GROUP B STREP CULTURE: NORMAL

## 2019-03-12 ENCOUNTER — ROUTINE PRENATAL (OUTPATIENT)
Dept: OBGYN | Age: 44
End: 2019-03-12

## 2019-03-12 ENCOUNTER — HOSPITAL ENCOUNTER (OUTPATIENT)
Dept: ULTRASOUND IMAGING | Age: 44
Discharge: HOME | End: 2019-03-12

## 2019-03-12 ENCOUNTER — HOSPITAL ENCOUNTER (OUTPATIENT)
Age: 44
Discharge: HOME | End: 2019-03-12
Attending: OBSTETRICS & GYNECOLOGY | Admitting: OBSTETRICS & GYNECOLOGY

## 2019-03-12 VITALS
SYSTOLIC BLOOD PRESSURE: 103 MMHG | HEART RATE: 79 BPM | BODY MASS INDEX: 26.83 KG/M2 | WEIGHT: 142 LBS | DIASTOLIC BLOOD PRESSURE: 65 MMHG

## 2019-03-12 DIAGNOSIS — Z34.93 PRENATAL CARE IN THIRD TRIMESTER: Primary | ICD-10-CM

## 2019-03-12 DIAGNOSIS — Z34.83 PRENATAL CARE, SUBSEQUENT PREGNANCY, THIRD TRIMESTER: ICD-10-CM

## 2019-03-12 LAB
BILIRUBIN URINE: NEGATIVE MG/DL
BLOOD, URINE: NEGATIVE
CLARITY: CLEAR
COLOR: YELLOW
GLUCOSE URINE: NEGATIVE MG/DL
KETONES, URINE: ABNORMAL MG/DL
LEUKOCYTE ESTERASE, URINE: ABNORMAL
NITRITE, URINE: POSITIVE
PH UA: 6.5 (ref 5–8)
PROTEIN UA: 30 MG/DL
SPECIFIC GRAVITY UA: >=1.03 (ref 1–1.03)
UROBILINOGEN, URINE: 0.2 E.U./DL

## 2019-03-12 PROCEDURE — 76805 OB US >/= 14 WKS SNGL FETUS: CPT

## 2019-03-12 PROCEDURE — 81003 URINALYSIS AUTO W/O SCOPE: CPT

## 2019-03-12 PROCEDURE — 76819 FETAL BIOPHYS PROFIL W/O NST: CPT

## 2019-03-12 PROCEDURE — 59025 FETAL NON-STRESS TEST: CPT

## 2019-03-19 ENCOUNTER — ROUTINE PRENATAL (OUTPATIENT)
Dept: OBGYN | Age: 44
End: 2019-03-19

## 2019-03-19 ENCOUNTER — HOSPITAL ENCOUNTER (OUTPATIENT)
Age: 44
Discharge: HOME | End: 2019-03-19
Attending: OBSTETRICS & GYNECOLOGY | Admitting: OBSTETRICS & GYNECOLOGY

## 2019-03-19 VITALS
HEART RATE: 88 BPM | SYSTOLIC BLOOD PRESSURE: 112 MMHG | DIASTOLIC BLOOD PRESSURE: 65 MMHG | WEIGHT: 145 LBS | BODY MASS INDEX: 27.4 KG/M2

## 2019-03-19 VITALS — RESPIRATION RATE: 18 BRPM | TEMPERATURE: 97.6 F

## 2019-03-19 DIAGNOSIS — Z34.93 PRENATAL CARE IN THIRD TRIMESTER: Primary | ICD-10-CM

## 2019-03-19 LAB
BILIRUBIN URINE: ABNORMAL MG/DL
BLOOD, URINE: NEGATIVE
CLARITY: CLEAR
COLOR: YELLOW
GLUCOSE URINE: NEGATIVE MG/DL
KETONES, URINE: ABNORMAL MG/DL
LEUKOCYTE ESTERASE, URINE: NEGATIVE
NITRITE, URINE: NEGATIVE
PH UA: 7 (ref 5–8)
PROTEIN UA: 100 MG/DL
SPECIFIC GRAVITY UA: >=1.03 (ref 1–1.03)
UROBILINOGEN, URINE: 2 E.U./DL

## 2019-03-19 PROCEDURE — 81003 URINALYSIS AUTO W/O SCOPE: CPT

## 2019-03-19 PROCEDURE — 99212 OFFICE O/P EST SF 10 MIN: CPT | Performed by: OBSTETRICS & GYNECOLOGY

## 2019-03-19 PROCEDURE — 59025 FETAL NON-STRESS TEST: CPT

## 2019-03-25 DIAGNOSIS — O09.523 AMA (ADVANCED MATERNAL AGE) MULTIGRAVIDA 35+, THIRD TRIMESTER: Primary | ICD-10-CM

## 2019-03-26 ENCOUNTER — HOSPITAL ENCOUNTER (OUTPATIENT)
Dept: ULTRASOUND IMAGING | Age: 44
Discharge: HOME | End: 2019-03-26

## 2019-03-26 ENCOUNTER — ROUTINE PRENATAL (OUTPATIENT)
Dept: OBGYN | Age: 44
End: 2019-03-26

## 2019-03-26 VITALS
HEART RATE: 84 BPM | SYSTOLIC BLOOD PRESSURE: 105 MMHG | BODY MASS INDEX: 27.59 KG/M2 | WEIGHT: 146 LBS | DIASTOLIC BLOOD PRESSURE: 68 MMHG

## 2019-03-26 DIAGNOSIS — Z34.93 PRENATAL CARE IN THIRD TRIMESTER: Primary | ICD-10-CM

## 2019-03-26 DIAGNOSIS — O09.523 AMA (ADVANCED MATERNAL AGE) MULTIGRAVIDA 35+, THIRD TRIMESTER: ICD-10-CM

## 2019-03-26 PROCEDURE — 99212 OFFICE O/P EST SF 10 MIN: CPT | Performed by: OBSTETRICS & GYNECOLOGY

## 2019-03-26 PROCEDURE — 76819 FETAL BIOPHYS PROFIL W/O NST: CPT

## 2019-03-26 PROCEDURE — 81003 URINALYSIS AUTO W/O SCOPE: CPT

## 2019-03-27 ENCOUNTER — ANESTHESIA (OUTPATIENT)
Dept: LABOR AND DELIVERY | Age: 44
DRG: 560 | End: 2019-03-27
Payer: MEDICAID

## 2019-03-27 ENCOUNTER — HOSPITAL ENCOUNTER (INPATIENT)
Age: 44
LOS: 2 days | Discharge: HOME | DRG: 560 | End: 2019-03-29
Attending: OBSTETRICS & GYNECOLOGY | Admitting: OBSTETRICS & GYNECOLOGY
Payer: MEDICAID

## 2019-03-27 ENCOUNTER — ANESTHESIA EVENT (OUTPATIENT)
Dept: LABOR AND DELIVERY | Age: 44
DRG: 560 | End: 2019-03-27
Payer: MEDICAID

## 2019-03-27 PROBLEM — O47.9 THREATENED LABOR: Status: ACTIVE | Noted: 2019-03-27

## 2019-03-27 LAB
ABO/RH: NORMAL
AMPHETAMINE SCREEN, URINE: NORMAL
ANTIBODY SCREEN: NORMAL
BARBITURATE SCREEN URINE: NORMAL
BASOPHILS ABSOLUTE: 0 K/UL (ref 0–0.2)
BASOPHILS RELATIVE PERCENT: 0.2 %
BENZODIAZEPINE SCREEN, URINE: NORMAL
BILIRUBIN URINE: ABNORMAL MG/DL
BLOOD, URINE: NEGATIVE
BUPRENORPHINE URINE: NORMAL
CANNABINOID SCREEN URINE: NORMAL
CLARITY: CLEAR
COCAINE METABOLITE SCREEN URINE: NORMAL
COLOR: YELLOW
EOSINOPHILS ABSOLUTE: 0 K/UL (ref 0–0.6)
EOSINOPHILS RELATIVE PERCENT: 0.2 %
GLUCOSE URINE: NEGATIVE MG/DL
HCT VFR BLD CALC: 39.6 % (ref 36–48)
HEMOGLOBIN: 12.7 G/DL (ref 12–16)
KETONES, URINE: ABNORMAL MG/DL
LEUKOCYTE ESTERASE, URINE: NEGATIVE
LYMPHOCYTES ABSOLUTE: 1.4 K/UL (ref 1–5.1)
LYMPHOCYTES RELATIVE PERCENT: 11.7 %
Lab: NORMAL
MCH RBC QN AUTO: 28.6 PG (ref 26–34)
MCHC RBC AUTO-ENTMCNC: 31.9 G/DL (ref 31–36)
MCV RBC AUTO: 89.5 FL (ref 80–100)
METHADONE SCREEN, URINE: NORMAL
MONOCYTES ABSOLUTE: 0.6 K/UL (ref 0–1.3)
MONOCYTES RELATIVE PERCENT: 4.8 %
NEUTROPHILS ABSOLUTE: 10 K/UL (ref 1.7–7.7)
NEUTROPHILS RELATIVE PERCENT: 83.1 %
NITRITE, URINE: NEGATIVE
OPIATE SCREEN URINE: NORMAL
OXYCODONE URINE: NORMAL
PDW BLD-RTO: 16.6 % (ref 12.4–15.4)
PH UA: 6.5
PH UA: 6.5 (ref 5–8)
PHENCYCLIDINE SCREEN URINE: NORMAL
PLATELET # BLD: 129 K/UL (ref 135–450)
PMV BLD AUTO: 10.4 FL (ref 5–10.5)
PROPOXYPHENE SCREEN: NORMAL
PROTEIN UA: 100 MG/DL
RBC # BLD: 4.43 M/UL (ref 4–5.2)
SPECIFIC GRAVITY UA: >=1.03 (ref 1–1.03)
SPECIMEN STATUS: NORMAL
UROBILINOGEN, URINE: 1 E.U./DL
WBC # BLD: 12 K/UL (ref 4–11)

## 2019-03-27 PROCEDURE — 51702 INSERT TEMP BLADDER CATH: CPT

## 2019-03-27 PROCEDURE — 86780 TREPONEMA PALLIDUM: CPT

## 2019-03-27 PROCEDURE — 7200000001 HC VAGINAL DELIVERY

## 2019-03-27 PROCEDURE — 80307 DRUG TEST PRSMV CHEM ANLYZR: CPT

## 2019-03-27 PROCEDURE — 6370000000 HC RX 637 (ALT 250 FOR IP): Performed by: OBSTETRICS & GYNECOLOGY

## 2019-03-27 PROCEDURE — 51701 INSERT BLADDER CATHETER: CPT

## 2019-03-27 PROCEDURE — 85025 COMPLETE CBC W/AUTO DIFF WBC: CPT

## 2019-03-27 PROCEDURE — 86901 BLOOD TYPING SEROLOGIC RH(D): CPT

## 2019-03-27 PROCEDURE — 1200000000 HC SEMI PRIVATE

## 2019-03-27 PROCEDURE — 3700000025 EPIDURAL BLOCK: Performed by: ANESTHESIOLOGY

## 2019-03-27 PROCEDURE — 2500000003 HC RX 250 WO HCPCS: Performed by: NURSE ANESTHETIST, CERTIFIED REGISTERED

## 2019-03-27 PROCEDURE — 6360000002 HC RX W HCPCS: Performed by: OBSTETRICS & GYNECOLOGY

## 2019-03-27 PROCEDURE — 86850 RBC ANTIBODY SCREEN: CPT

## 2019-03-27 PROCEDURE — 86900 BLOOD TYPING SEROLOGIC ABO: CPT

## 2019-03-27 PROCEDURE — 2580000003 HC RX 258: Performed by: OBSTETRICS & GYNECOLOGY

## 2019-03-27 RX ORDER — OXYCODONE HYDROCHLORIDE AND ACETAMINOPHEN 5; 325 MG/1; MG/1
1 TABLET ORAL EVERY 4 HOURS PRN
Status: DISCONTINUED | OUTPATIENT
Start: 2019-03-27 | End: 2019-03-29 | Stop reason: HOSPADM

## 2019-03-27 RX ORDER — FAMOTIDINE 20 MG/1
20 TABLET, FILM COATED ORAL 2 TIMES DAILY PRN
Status: DISCONTINUED | OUTPATIENT
Start: 2019-03-27 | End: 2019-03-29 | Stop reason: HOSPADM

## 2019-03-27 RX ORDER — SIMETHICONE 80 MG
80 TABLET,CHEWABLE ORAL EVERY 6 HOURS PRN
Status: DISCONTINUED | OUTPATIENT
Start: 2019-03-27 | End: 2019-03-29 | Stop reason: HOSPADM

## 2019-03-27 RX ORDER — FERROUS SULFATE 325(65) MG
325 TABLET ORAL DAILY
Status: DISCONTINUED | OUTPATIENT
Start: 2019-03-27 | End: 2019-03-29 | Stop reason: HOSPADM

## 2019-03-27 RX ORDER — MISOPROSTOL 100 UG/1
800 TABLET ORAL PRN
Status: DISCONTINUED | OUTPATIENT
Start: 2019-03-27 | End: 2019-03-29 | Stop reason: HOSPADM

## 2019-03-27 RX ORDER — DOCUSATE SODIUM 100 MG/1
100 CAPSULE, LIQUID FILLED ORAL 2 TIMES DAILY
Status: DISCONTINUED | OUTPATIENT
Start: 2019-03-27 | End: 2019-03-27

## 2019-03-27 RX ORDER — DOCUSATE SODIUM 100 MG/1
100 CAPSULE, LIQUID FILLED ORAL 2 TIMES DAILY
Status: DISCONTINUED | OUTPATIENT
Start: 2019-03-27 | End: 2019-03-29 | Stop reason: HOSPADM

## 2019-03-27 RX ORDER — CARBOPROST TROMETHAMINE 250 UG/ML
250 INJECTION, SOLUTION INTRAMUSCULAR PRN
Status: DISCONTINUED | OUTPATIENT
Start: 2019-03-27 | End: 2019-03-27

## 2019-03-27 RX ORDER — LIDOCAINE HYDROCHLORIDE AND EPINEPHRINE 20; 5 MG/ML; UG/ML
INJECTION, SOLUTION EPIDURAL; INFILTRATION; INTRACAUDAL; PERINEURAL PRN
Status: DISCONTINUED | OUTPATIENT
Start: 2019-03-27 | End: 2019-03-27 | Stop reason: SDUPTHER

## 2019-03-27 RX ORDER — SODIUM CHLORIDE, SODIUM LACTATE, POTASSIUM CHLORIDE, CALCIUM CHLORIDE 600; 310; 30; 20 MG/100ML; MG/100ML; MG/100ML; MG/100ML
INJECTION, SOLUTION INTRAVENOUS CONTINUOUS
Status: DISCONTINUED | OUTPATIENT
Start: 2019-03-27 | End: 2019-03-27

## 2019-03-27 RX ORDER — METHYLERGONOVINE MALEATE 0.2 MG/ML
200 INJECTION INTRAVENOUS ONCE
Status: COMPLETED | OUTPATIENT
Start: 2019-03-27 | End: 2019-03-27

## 2019-03-27 RX ORDER — METHYLERGONOVINE MALEATE 0.2 MG/ML
200 INJECTION INTRAVENOUS
Status: ACTIVE | OUTPATIENT
Start: 2019-03-27 | End: 2019-03-27

## 2019-03-27 RX ORDER — OXYCODONE HYDROCHLORIDE AND ACETAMINOPHEN 5; 325 MG/1; MG/1
1-2 TABLET ORAL EVERY 4 HOURS PRN
Qty: 10 TABLET | Refills: 0 | Status: SHIPPED | OUTPATIENT
Start: 2019-03-27 | End: 2019-06-21

## 2019-03-27 RX ORDER — SENNA AND DOCUSATE SODIUM 50; 8.6 MG/1; MG/1
1 TABLET, FILM COATED ORAL DAILY PRN
Status: DISCONTINUED | OUTPATIENT
Start: 2019-03-27 | End: 2019-03-29 | Stop reason: HOSPADM

## 2019-03-27 RX ORDER — ACETAMINOPHEN 325 MG/1
650 TABLET ORAL EVERY 4 HOURS PRN
Status: DISCONTINUED | OUTPATIENT
Start: 2019-03-27 | End: 2019-03-29 | Stop reason: HOSPADM

## 2019-03-27 RX ORDER — IBUPROFEN 800 MG/1
800 TABLET ORAL EVERY 6 HOURS PRN
Qty: 40 TABLET | Refills: 0 | Status: SHIPPED | OUTPATIENT
Start: 2019-03-27 | End: 2019-06-21

## 2019-03-27 RX ORDER — ACETAMINOPHEN 325 MG/1
650 TABLET ORAL EVERY 4 HOURS PRN
Status: DISCONTINUED | OUTPATIENT
Start: 2019-03-27 | End: 2019-03-27

## 2019-03-27 RX ORDER — OXYCODONE HYDROCHLORIDE AND ACETAMINOPHEN 5; 325 MG/1; MG/1
2 TABLET ORAL EVERY 4 HOURS PRN
Status: DISCONTINUED | OUTPATIENT
Start: 2019-03-27 | End: 2019-03-29 | Stop reason: HOSPADM

## 2019-03-27 RX ORDER — ONDANSETRON 2 MG/ML
4 INJECTION INTRAMUSCULAR; INTRAVENOUS EVERY 6 HOURS PRN
Status: DISCONTINUED | OUTPATIENT
Start: 2019-03-27 | End: 2019-03-29 | Stop reason: HOSPADM

## 2019-03-27 RX ORDER — LANOLIN 100 %
OINTMENT (GRAM) TOPICAL PRN
Status: DISCONTINUED | OUTPATIENT
Start: 2019-03-27 | End: 2019-03-29 | Stop reason: HOSPADM

## 2019-03-27 RX ORDER — METHYLERGONOVINE MALEATE 0.2 MG/ML
200 INJECTION INTRAVENOUS PRN
Status: DISCONTINUED | OUTPATIENT
Start: 2019-03-27 | End: 2019-03-27

## 2019-03-27 RX ORDER — MISOPROSTOL 100 UG/1
800 TABLET ORAL PRN
Status: DISCONTINUED | OUTPATIENT
Start: 2019-03-27 | End: 2019-03-27

## 2019-03-27 RX ORDER — SODIUM CHLORIDE 0.9 % (FLUSH) 0.9 %
10 SYRINGE (ML) INJECTION EVERY 12 HOURS SCHEDULED
Status: DISCONTINUED | OUTPATIENT
Start: 2019-03-27 | End: 2019-03-29 | Stop reason: HOSPADM

## 2019-03-27 RX ORDER — SODIUM CHLORIDE 0.9 % (FLUSH) 0.9 %
10 SYRINGE (ML) INJECTION PRN
Status: DISCONTINUED | OUTPATIENT
Start: 2019-03-27 | End: 2019-03-27

## 2019-03-27 RX ORDER — ONDANSETRON 2 MG/ML
4 INJECTION INTRAMUSCULAR; INTRAVENOUS EVERY 6 HOURS PRN
Status: DISCONTINUED | OUTPATIENT
Start: 2019-03-27 | End: 2019-03-27

## 2019-03-27 RX ORDER — IBUPROFEN 600 MG/1
600 TABLET ORAL EVERY 6 HOURS SCHEDULED
Status: DISCONTINUED | OUTPATIENT
Start: 2019-03-27 | End: 2019-03-29 | Stop reason: HOSPADM

## 2019-03-27 RX ORDER — TERBUTALINE SULFATE 1 MG/ML
0.25 INJECTION, SOLUTION SUBCUTANEOUS ONCE
Status: DISCONTINUED | OUTPATIENT
Start: 2019-03-27 | End: 2019-03-27

## 2019-03-27 RX ORDER — BUPIVACAINE HYDROCHLORIDE 2.5 MG/ML
INJECTION, SOLUTION EPIDURAL; INFILTRATION; INTRACAUDAL PRN
Status: DISCONTINUED | OUTPATIENT
Start: 2019-03-27 | End: 2019-03-27 | Stop reason: SDUPTHER

## 2019-03-27 RX ORDER — SODIUM CHLORIDE 0.9 % (FLUSH) 0.9 %
10 SYRINGE (ML) INJECTION PRN
Status: DISCONTINUED | OUTPATIENT
Start: 2019-03-27 | End: 2019-03-29 | Stop reason: HOSPADM

## 2019-03-27 RX ORDER — CARBOPROST TROMETHAMINE 250 UG/ML
250 INJECTION, SOLUTION INTRAMUSCULAR
Status: ACTIVE | OUTPATIENT
Start: 2019-03-27 | End: 2019-03-27

## 2019-03-27 RX ORDER — ONDANSETRON 4 MG/1
4 TABLET, ORALLY DISINTEGRATING ORAL EVERY 6 HOURS PRN
Status: DISCONTINUED | OUTPATIENT
Start: 2019-03-27 | End: 2019-03-29 | Stop reason: HOSPADM

## 2019-03-27 RX ADMIN — BENZOCAINE AND LEVOMENTHOL: 200; 5 SPRAY TOPICAL at 15:02

## 2019-03-27 RX ADMIN — DOCUSATE SODIUM 100 MG: 100 CAPSULE, LIQUID FILLED ORAL at 21:07

## 2019-03-27 RX ADMIN — Medication 12 ML/HR: at 10:09

## 2019-03-27 RX ADMIN — Medication 1 MILLI-UNITS/MIN: at 13:55

## 2019-03-27 RX ADMIN — SODIUM CHLORIDE, POTASSIUM CHLORIDE, SODIUM LACTATE AND CALCIUM CHLORIDE: 600; 310; 30; 20 INJECTION, SOLUTION INTRAVENOUS at 09:30

## 2019-03-27 RX ADMIN — IBUPROFEN 600 MG: 600 TABLET, FILM COATED ORAL at 17:12

## 2019-03-27 RX ADMIN — SODIUM CHLORIDE, POTASSIUM CHLORIDE, SODIUM LACTATE AND CALCIUM CHLORIDE: 600; 310; 30; 20 INJECTION, SOLUTION INTRAVENOUS at 10:11

## 2019-03-27 RX ADMIN — METHYLERGONOVINE MALEATE 200 MCG: 0.2 INJECTION INTRAVENOUS at 13:52

## 2019-03-27 RX ADMIN — BUPIVACAINE HYDROCHLORIDE 5 ML: 2.5 INJECTION, SOLUTION EPIDURAL; INFILTRATION; INTRACAUDAL; PERINEURAL at 10:09

## 2019-03-27 RX ADMIN — SODIUM CHLORIDE, POTASSIUM CHLORIDE, SODIUM LACTATE AND CALCIUM CHLORIDE: 600; 310; 30; 20 INJECTION, SOLUTION INTRAVENOUS at 12:30

## 2019-03-27 RX ADMIN — LIDOCAINE HYDROCHLORIDE AND EPINEPHRINE 3 ML: 20; 5 INJECTION, SOLUTION EPIDURAL; INFILTRATION; INTRACAUDAL; PERINEURAL at 10:07

## 2019-03-27 RX ADMIN — ACETAMINOPHEN 650 MG: 325 TABLET, FILM COATED ORAL at 18:22

## 2019-03-27 ASSESSMENT — PAIN SCALES - GENERAL
PAINLEVEL_OUTOF10: 3
PAINLEVEL_OUTOF10: 2

## 2019-03-27 NOTE — FLOWSHEET NOTE
Patient is able to move toes but unable to bend her knees. RN straight cathed patient for 800 cc clear, yellow urine.

## 2019-03-27 NOTE — DISCHARGE SUMMARY
Obstetrical Discharge Form    Gestational Age: 37w11d    Antepartum complications: AMA< grand multiparity    Date of Delivery: 3/27/2019      Type of Delivery: vaginal, spontaneous    Delivered By: Mahin Mathias     Baby:      Information for the patient's :  Analy Vasquez du sac [[id number]]   APGAR One: 8    Information for the patient's :  Analy Vasquez du sac [[id number]]   APGAR Five: 9    Information for the patient's :  Analy Vasquez du sac [[id number]]   Birth Weight: 7 lb 1.9 oz (3.23 kg)      Anesthesia: Epidural    Intrapartum complications: None    Postpartum complications: none    Discharge Medication:    Maricruz Curry, [address] Medication Instructions DIW:[id number]    Printed on:19 1206   Medication Information                      ferrous sulfate 325 (65 Fe) MG tablet  Take 1 tablet by mouth daily             ibuprofen (ADVIL;MOTRIN) 800 MG tablet  Take 1 tablet by mouth every 6 hours as needed for Pain             oxyCODONE-acetaminophen (PERCOCET) 5-325 MG per tablet  Take 1-2 tablets by mouth every 4 hours as needed for Pain. Prenatal Vit-Fe Fumarate-FA (PRENATAL VITAMIN PLUS LOW IRON) 27-1 MG TABS  Take 1 tablet by mouth daily                  Discharge Condition:  good    Discharge Date: 19    PLAN:  Follow up in 6 weeks for routine PP visit  All questions answered  D/C summary begun at delivery for D/C planning purposes, any delay in discharge from ordered D/C date due to  factors.

## 2019-03-27 NOTE — PROCEDURES
perineum and vagina were explored and a intact perineum was noted   One loop of nuchal cord was released.

## 2019-03-27 NOTE — FLOWSHEET NOTE
Assisted patient to bathroom x2 with steady gait to urinate 200 cc urine. Pt instructed on perineal care and self administration of perineal meds. Pt verbalized understanding and may self medicate.

## 2019-03-27 NOTE — H&P
Department of Obstetrics and Gynecology   Obstetrics History and Physical        CHIEF COMPLAINT:  contractions    HISTORY OF PRESENT ILLNESS:      The patient is a 41 y.o. female at 37w11d. OB History        9    Para   8    Term   8       0    AB   0    Living   8       SAB   0    TAB   0    Ectopic   0    Molar   0    Multiple   0    Live Births   8            Patient presents with a chief complaint as above and is being admitted for active phase labor    Estimated Due Date: Estimated Date of Delivery: 3/28/19    PRENATAL CARE:    Complicated by: grand multiparity, AMA, mild anemia on PO fe    PAST OB HISTORY:  OB History        9    Para   8    Term   8       0    AB   0    Living   8       SAB   0    TAB   0    Ectopic   0    Molar   0    Multiple   0    Live Births   8                Past Medical History:        Diagnosis Date    AMA (advanced maternal age) multigravida 35+     Anemia     Pt taking PO Iron.  SGA (small for gestational age)     EFW 0g on 4/3/18.  18%. Past Surgical History:    No past surgical history on file. Allergies:  Patient has no known allergies. Social History:    Social History     Socioeconomic History    Marital status:       Spouse name: Kassandra Kong    Number of children: 5    Years of education: 6th    Highest education level: Not on file   Occupational History    Occupation: unemployed   Social Needs    Financial resource strain: Not on file    Food insecurity:     Worry: Not on file     Inability: Not on file   [employer] needs:     Medical: Not on file     Non-medical: Not on file   Tobacco Use    Smoking status: Never Smoker    Smokeless tobacco: Never Used   Substance and Sexual Activity    Alcohol use: No     Alcohol/week: 0.0 oz    Drug use: No    Sexual activity: Yes     Partners: Male   Lifestyle    Physical activity:     Days per week: Not on file     Minutes per session: Not on file    Stress: Not on file   Relationships    Social connections:     Talks on phone: Not on file     Gets together: Not on file     Attends Holiness service: Not on file     Active member of club or organization: Not on file     Attends meetings of clubs or organizations: Not on file     Relationship status: Not on file    Intimate partner violence:     Fear of current or ex partner: Not on file     Emotionally abused: Not on file     Physically abused: Not on file     Forced sexual activity: Not on file   Other Topics Concern    Not on file   Social History Narrative    Not on file     Family History:   No family history on file. Medications Prior to Admission:  Medications Prior to Admission: ferrous sulfate 325 (65 Fe) MG tablet, Take 1 tablet by mouth daily  Prenatal Vit-Fe Fumarate-FA (PRENATAL VITAMIN PLUS LOW IRON) 27-1 MG TABS, Take 1 tablet by mouth daily    REVIEW OF SYSTEMS:    Pain with CTXs    PHYSICAL EXAM:  Vitals:    03/27/19 1013 03/27/19 1018 03/27/19 1019 03/27/19 1023   BP: (!) 101/55 105/68  (!) 99/50   Pulse: 78 91  82   Resp: 20 20     Temp:   96.9 °F (36.1 °C)    TempSrc:   Temporal    SpO2: 100% 100%     Weight:       Height:         General appearance:  awake, alert, cooperative, in pain with CTXs, and appears stated age  Neurologic:  Awake, alert, oriented to name, place and time. Lungs:  No increased work of breathing, good air exchange  Abdomen:  Soft, non tender, gravid, consistent with her gestational age, EFW by Leopold's maneuver was 6-7 lbs   Fetal heart rate:  Reassuring.   Pelvis:  Adequate pelvis  Cervix: 4/70%/-2/posterior  Contraction frequency:  Every 4-5 mins    Membranes:  Intact    Labs:   CBC:   Lab Results   Component Value Date    WBC 12.0 03/27/2019    RBC 4.43 03/27/2019    HGB 12.7 03/27/2019    HCT 39.6 03/27/2019    MCV 89.5 03/27/2019    MCH 28.6 03/27/2019    MCHC 31.9 03/27/2019    RDW 16.6 03/27/2019     03/27/2019    MPV 10.4 03/27/2019       ASSESSMENT AND PLAN:    Labor: Admit, anticipate normal delivery, routine labor orders  Fetus: Reassuring  GBS: No  Discussed risk of PPH with patient, will T&S patient.

## 2019-03-27 NOTE — FLOWSHEET NOTE
Patient transferred to postpartum via bed by self and settled into postpartum room. Pt oriented to folder and postpartum care. Oriented to call light, phone and ordering meals. Postpartum RN's name and phone number posted for pt. Jayails up x2. Pt oriented to equipment. Sampson Hawley N [facility] , at bedside. RN instructed patient to call for assistance to bathroom when she needs to void and patient verbalizes understanding.

## 2019-03-28 LAB — TOTAL SYPHILLIS IGG/IGM: NORMAL

## 2019-03-28 PROCEDURE — 1200000000 HC SEMI PRIVATE

## 2019-03-28 PROCEDURE — 6370000000 HC RX 637 (ALT 250 FOR IP): Performed by: OBSTETRICS & GYNECOLOGY

## 2019-03-28 RX ADMIN — DOCUSATE SODIUM 100 MG: 100 CAPSULE, LIQUID FILLED ORAL at 09:53

## 2019-03-28 RX ADMIN — DOCUSATE SODIUM 100 MG: 100 CAPSULE, LIQUID FILLED ORAL at 20:09

## 2019-03-28 RX ADMIN — IBUPROFEN 600 MG: 600 TABLET, FILM COATED ORAL at 06:22

## 2019-03-28 RX ADMIN — IBUPROFEN 600 MG: 600 TABLET, FILM COATED ORAL at 13:26

## 2019-03-28 RX ADMIN — IBUPROFEN 600 MG: 600 TABLET, FILM COATED ORAL at 19:01

## 2019-03-28 RX ADMIN — IBUPROFEN 600 MG: 600 TABLET, FILM COATED ORAL at 00:02

## 2019-03-28 RX ADMIN — ACETAMINOPHEN 650 MG: 325 TABLET, FILM COATED ORAL at 09:53

## 2019-03-28 RX ADMIN — ACETAMINOPHEN 650 MG: 325 TABLET, FILM COATED ORAL at 05:26

## 2019-03-28 RX ADMIN — ACETAMINOPHEN 650 MG: 325 TABLET, FILM COATED ORAL at 16:43

## 2019-03-28 ASSESSMENT — PAIN SCALES - GENERAL
PAINLEVEL_OUTOF10: 2
PAINLEVEL_OUTOF10: 1
PAINLEVEL_OUTOF10: 1
PAINLEVEL_OUTOF10: 3
PAINLEVEL_OUTOF10: 3
PAINLEVEL_OUTOF10: 2
PAINLEVEL_OUTOF10: 5

## 2019-03-28 NOTE — PROGRESS NOTES
Department of Obstetrics and Gynecology  Labor and Delivery  Attending Post Partum Progress Note      SUBJECTIVE:  No problems. OBJECTIVE:      Vitals:  BP (!) 80/46   Pulse 65   Temp 97.8 °F (36.6 °C) (Oral)   Resp 16   Ht 5' 1\" (1.549 m)   Wt 146 lb (66.2 kg)   LMP 06/28/2018   SpO2 100%   Breastfeeding?  Unknown   BMI 27.59 kg/m²     CONSTITUTIONAL:  awake, alert, cooperative, no apparent distress, and appears stated age    DATA:    CBC:    Lab Results   Component Value Date    WBC 12.0 03/27/2019    RBC 4.43 03/27/2019    HGB 12.7 03/27/2019    HCT 39.6 03/27/2019    MCV 89.5 03/27/2019    RDW 16.6 03/27/2019     03/27/2019       ASSESSMENT & PLAN:      Patient Active Hospital Problem List:   Normal labor (4/18/2018)    Assessment: PPD 1     Plan: routine care, home in am.

## 2019-03-28 NOTE — PLAN OF CARE
Problem: Pain:  Goal: Control of acute pain  Description  Control of acute pain  Outcome: Ongoing     Problem: Fluid Volume - Imbalance:  Goal: Absence of postpartum hemorrhage signs and symptoms  Description  Absence of postpartum hemorrhage signs and symptoms  Outcome: Ongoing     Problem: Mood - Altered:  Goal: Mood stable  Description  Mood stable  Outcome: Ongoing     Problem: Falls - Risk of:  Goal: Will remain free from falls  Description  Will remain free from falls  Outcome: Ongoing

## 2019-03-29 VITALS
RESPIRATION RATE: 18 BRPM | HEART RATE: 65 BPM | DIASTOLIC BLOOD PRESSURE: 54 MMHG | TEMPERATURE: 98 F | HEIGHT: 61 IN | SYSTOLIC BLOOD PRESSURE: 93 MMHG | WEIGHT: 146 LBS | BODY MASS INDEX: 27.56 KG/M2 | OXYGEN SATURATION: 100 %

## 2019-03-29 PROCEDURE — 6370000000 HC RX 637 (ALT 250 FOR IP): Performed by: OBSTETRICS & GYNECOLOGY

## 2019-03-29 RX ADMIN — IBUPROFEN 600 MG: 600 TABLET, FILM COATED ORAL at 06:09

## 2019-03-29 RX ADMIN — IBUPROFEN 600 MG: 600 TABLET, FILM COATED ORAL at 01:09

## 2019-03-29 RX ADMIN — DOCUSATE SODIUM 100 MG: 100 CAPSULE, LIQUID FILLED ORAL at 09:07

## 2019-03-29 RX ADMIN — IBUPROFEN 600 MG: 600 TABLET, FILM COATED ORAL at 11:49

## 2019-03-29 ASSESSMENT — PAIN SCALES - GENERAL
PAINLEVEL_OUTOF10: 0
PAINLEVEL_OUTOF10: 1
PAINLEVEL_OUTOF10: 0
PAINLEVEL_OUTOF10: 1

## 2019-03-29 NOTE — PLAN OF CARE
Problem: Pain:  Goal: Control of acute pain  Description  Control of acute pain  3/29/2019 0328 by Frank Melgar RN  Outcome: Ongoing  3/28/2019 1329 by Rachel Atkinson RN  Outcome: Met This Shift  Goal: Control of chronic pain  Description  Control of chronic pain  Outcome: Ongoing     Problem: Fluid Volume - Imbalance:  Goal: Absence of postpartum hemorrhage signs and symptoms  Description  Absence of postpartum hemorrhage signs and symptoms  3/29/2019 0328 by Frank Melgar RN  Outcome: Ongoing  3/28/2019 1329 by Rachel Atkinson RN  Outcome: Met This Shift     Problem: Discharge Planning:  Goal: Discharged to appropriate level of care  Description  Discharged to appropriate level of care  3/29/2019 0328 by Frank Melgar RN  Outcome: Ongoing  3/28/2019 1329 by Rachel Atkinson RN  Outcome: Ongoing     Problem: Constipation:  Goal: Bowel elimination is within specified parameters  Description  Bowel elimination is within specified parameters  3/29/2019 0328 by Frank Melgar RN  Outcome: Ongoing  3/28/2019 1329 by Rachel Atkinson RN  Outcome: Ongoing     Problem: Mood - Altered:  Goal: Mood stable  Description  Mood stable  3/29/2019 0328 by Frank Melgar RN  Outcome: Ongoing  3/28/2019 1329 by Rachel Atkinson RN  Outcome: Met This Shift     Problem: Falls - Risk of:  Goal: Will remain free from falls  Description  Will remain free from falls  3/29/2019 0328 by Frank Melgar RN  Outcome: Ongoing  3/28/2019 1329 by Rachel Atkinson RN  Outcome: Met This Shift  Goal: Absence of physical injury  Description  Absence of physical injury  3/29/2019 0328 by Frank Melgar RN  Outcome: Ongoing  3/28/2019 1329 by Rachel Atkinson RN  Outcome: Met This Shift

## 2019-03-29 NOTE — FLOWSHEET NOTE
Report received from [place], RN.  Pt sleeping with infant sleeping in crib at bedside, FOB sleeping on sofa

## 2019-03-29 NOTE — FLOWSHEET NOTE
Postpartum and infant care teaching completed and forms signed by patient. Copy witnessed by RN and given to patient. Patient verbalized understanding of all teaching points. Patient has meds for home use. Patient plans to follow-up with [facility] Provider as instructed. Patient verbalizes understanding of discharge instructions and denies further questions. ID bands checked. Mother's ID band and one of baby's ID bands removed and taped to footprint sheet, signed by patient and witnessed by RN. Patient discharged in stable condition accompanied by family/guardian. Discharged in wheelchair, holding baby in arms.

## 2019-03-29 NOTE — PROGRESS NOTES
POSTPARTUM ROUNDS  [facility]    Post-partum Day #2 s/p , Body mass index is 27.59 kg/m². Subjective:    Patient doing well this am. No complaints. Pain controlled. Ambulating and voiding with no difficulty. Lochia: Normal, no clots  Feeding: bottle    Objective:  Vitals:    19 0830 19 1630 19 2030 19 0430   BP: (!) 80/46 (!) 98/56 (!) 98/56 99/61   Pulse: 65 69 78 58   Resp: 16    Temp: 97.8 °F (36.6 °C) 98.2 °F (36.8 °C) 98 °F (36.7 °C) 97.5 °F (36.4 °C)   TempSrc: Oral Oral Oral Oral   SpO2:       Weight:       Height:           Physical Examination:  Appears well  Uterus: Firm, NT  Calves: NT    Lab Results   Component Value Date    WBC 12.0 (H) 2019    HGB 12.7 2019    HCT 39.6 2019    MCV 89.5 2019     (L) 2019        Assessment:  41 y.o. [id number] PPD # 2 s/p , Body mass index is 27.59 kg/m².   A POS    Immunization History   Administered Date(s) Administered    Influenza Virus Vaccine 10/20/2015    Influenza, MDCK Quadv, IM, PF (Flucelvax 4 yrs and older) 2017    Influenza, Clifm Gens, 3 yrs and older, IM, PF (Fluzone 3 yrs and older or Afluria 5 yrs and older) 2016    Influenza, Clifm Gens, 6 mo and older, IM, PF (Flulaval, Fluarix) 2018    MMR 02/15/2016, 2018    Tdap (Boostrix, Adacel) 2016, 2017, 2019       Plan:    Discharge home today    [facility]

## 2019-06-21 ENCOUNTER — APPOINTMENT (OUTPATIENT)
Dept: CT IMAGING | Age: 44
DRG: 419 | End: 2019-06-21

## 2019-06-21 ENCOUNTER — APPOINTMENT (OUTPATIENT)
Dept: ULTRASOUND IMAGING | Age: 44
DRG: 419 | End: 2019-06-21

## 2019-06-21 ENCOUNTER — APPOINTMENT (OUTPATIENT)
Dept: GENERAL RADIOLOGY | Age: 44
DRG: 419 | End: 2019-06-21

## 2019-06-21 ENCOUNTER — ANESTHESIA EVENT (OUTPATIENT)
Dept: OPERATING ROOM | Age: 44
DRG: 419 | End: 2019-06-21

## 2019-06-21 ENCOUNTER — HOSPITAL ENCOUNTER (INPATIENT)
Age: 44
LOS: 1 days | Discharge: HOME | DRG: 419 | End: 2019-06-22
Attending: FAMILY MEDICINE | Admitting: SURGERY
Payer: MEDICAID

## 2019-06-21 ENCOUNTER — ANESTHESIA (OUTPATIENT)
Dept: OPERATING ROOM | Age: 44
DRG: 419 | End: 2019-06-21

## 2019-06-21 VITALS
TEMPERATURE: 95.4 F | DIASTOLIC BLOOD PRESSURE: 73 MMHG | RESPIRATION RATE: 23 BRPM | SYSTOLIC BLOOD PRESSURE: 138 MMHG | OXYGEN SATURATION: 97 %

## 2019-06-21 DIAGNOSIS — K80.20 GALLSTONES: ICD-10-CM

## 2019-06-21 DIAGNOSIS — E87.6 HYPOKALEMIA: ICD-10-CM

## 2019-06-21 DIAGNOSIS — K59.00 CONSTIPATION, UNSPECIFIED CONSTIPATION TYPE: ICD-10-CM

## 2019-06-21 DIAGNOSIS — R10.84 COLICKY ABDOMINAL PAIN: Primary | ICD-10-CM

## 2019-06-21 PROBLEM — K81.0 ACUTE CHOLECYSTITIS: Status: ACTIVE | Noted: 2019-06-21

## 2019-06-21 LAB
A/G RATIO: 1.3 (ref 1.1–2.2)
ALBUMIN SERPL-MCNC: 4.1 G/DL (ref 3.4–5)
ALP BLD-CCNC: 123 U/L (ref 40–129)
ALT SERPL-CCNC: 37 U/L (ref 10–40)
ANION GAP SERPL CALCULATED.3IONS-SCNC: 10 MMOL/L (ref 3–16)
AST SERPL-CCNC: 46 U/L (ref 15–37)
BACTERIA: ABNORMAL /HPF
BASOPHILS ABSOLUTE: 0 K/UL (ref 0–0.2)
BASOPHILS RELATIVE PERCENT: 0.4 %
BILIRUB SERPL-MCNC: <0.2 MG/DL (ref 0–1)
BILIRUBIN URINE: NEGATIVE
BLOOD, URINE: NEGATIVE
BUN BLDV-MCNC: 12 MG/DL (ref 7–20)
CALCIUM SERPL-MCNC: 9.4 MG/DL (ref 8.3–10.6)
CHLORIDE BLD-SCNC: 102 MMOL/L (ref 99–110)
CLARITY: CLEAR
CO2: 26 MMOL/L (ref 21–32)
COLOR: YELLOW
CREAT SERPL-MCNC: 0.6 MG/DL (ref 0.6–1.1)
D DIMER: 262 NG/ML DDU (ref 0–229)
EKG ATRIAL RATE: 53 BPM
EKG DIAGNOSIS: NORMAL
EKG P AXIS: 10 DEGREES
EKG P-R INTERVAL: 144 MS
EKG Q-T INTERVAL: 450 MS
EKG QRS DURATION: 78 MS
EKG QTC CALCULATION (BAZETT): 422 MS
EKG R AXIS: 36 DEGREES
EKG T AXIS: 46 DEGREES
EKG VENTRICULAR RATE: 53 BPM
EOSINOPHILS ABSOLUTE: 0 K/UL (ref 0–0.6)
EOSINOPHILS RELATIVE PERCENT: 0.6 %
EPITHELIAL CELLS, UA: 2 /HPF (ref 0–5)
GFR AFRICAN AMERICAN: >60
GFR NON-AFRICAN AMERICAN: >60
GLOBULIN: 3.1 G/DL
GLUCOSE BLD-MCNC: 126 MG/DL (ref 70–99)
GLUCOSE URINE: NEGATIVE MG/DL
HCG QUALITATIVE: NEGATIVE
HCT VFR BLD CALC: 38.2 % (ref 36–48)
HEMOGLOBIN: 12.7 G/DL (ref 12–16)
HYALINE CASTS: 0 /LPF (ref 0–8)
KETONES, URINE: NEGATIVE MG/DL
LEUKOCYTE ESTERASE, URINE: ABNORMAL
LIPASE: 44 U/L (ref 13–60)
LYMPHOCYTES ABSOLUTE: 2.1 K/UL (ref 1–5.1)
LYMPHOCYTES RELATIVE PERCENT: 28.7 %
MAGNESIUM: 2 MG/DL (ref 1.8–2.4)
MCH RBC QN AUTO: 30.6 PG (ref 26–34)
MCHC RBC AUTO-ENTMCNC: 33.2 G/DL (ref 31–36)
MCV RBC AUTO: 92.2 FL (ref 80–100)
MICROSCOPIC EXAMINATION: YES
MONOCYTES ABSOLUTE: 0.4 K/UL (ref 0–1.3)
MONOCYTES RELATIVE PERCENT: 5.5 %
NEUTROPHILS ABSOLUTE: 4.7 K/UL (ref 1.7–7.7)
NEUTROPHILS RELATIVE PERCENT: 64.8 %
NITRITE, URINE: NEGATIVE
PDW BLD-RTO: 13 % (ref 12.4–15.4)
PH UA: 7 (ref 5–8)
PLATELET # BLD: 144 K/UL (ref 135–450)
PMV BLD AUTO: 9.9 FL (ref 5–10.5)
POTASSIUM SERPL-SCNC: 3.2 MMOL/L (ref 3.5–5.1)
PROTEIN UA: NEGATIVE MG/DL
RBC # BLD: 4.15 M/UL (ref 4–5.2)
RBC UA: 1 /HPF (ref 0–4)
SODIUM BLD-SCNC: 138 MMOL/L (ref 136–145)
SPECIFIC GRAVITY UA: 1.01 (ref 1–1.03)
TOTAL PROTEIN: 7.2 G/DL (ref 6.4–8.2)
URINE REFLEX TO CULTURE: YES
URINE TYPE: ABNORMAL
UROBILINOGEN, URINE: 0.2 E.U./DL
WBC # BLD: 7.2 K/UL (ref 4–11)
WBC UA: 3 /HPF (ref 0–5)

## 2019-06-21 PROCEDURE — 1200000000 HC SEMI PRIVATE

## 2019-06-21 PROCEDURE — 96361 HYDRATE IV INFUSION ADD-ON: CPT

## 2019-06-21 PROCEDURE — 3600000004 HC SURGERY LEVEL 4 BASE: Performed by: SURGERY

## 2019-06-21 PROCEDURE — 6360000002 HC RX W HCPCS: Performed by: NURSE ANESTHETIST, CERTIFIED REGISTERED

## 2019-06-21 PROCEDURE — 85025 COMPLETE CBC W/AUTO DIFF WBC: CPT

## 2019-06-21 PROCEDURE — 74300 X-RAY BILE DUCTS/PANCREAS: CPT

## 2019-06-21 PROCEDURE — 76705 ECHO EXAM OF ABDOMEN: CPT

## 2019-06-21 PROCEDURE — 2580000003 HC RX 258: Performed by: ANESTHESIOLOGY

## 2019-06-21 PROCEDURE — 83735 ASSAY OF MAGNESIUM: CPT

## 2019-06-21 PROCEDURE — 6360000002 HC RX W HCPCS: Performed by: SURGERY

## 2019-06-21 PROCEDURE — 84703 CHORIONIC GONADOTROPIN ASSAY: CPT

## 2019-06-21 PROCEDURE — 88304 TISSUE EXAM BY PATHOLOGIST: CPT

## 2019-06-21 PROCEDURE — 2709999900 HC NON-CHARGEABLE SUPPLY: Performed by: SURGERY

## 2019-06-21 PROCEDURE — 71260 CT THORAX DX C+: CPT

## 2019-06-21 PROCEDURE — 2580000003 HC RX 258: Performed by: SURGERY

## 2019-06-21 PROCEDURE — 2580000003 HC RX 258: Performed by: FAMILY MEDICINE

## 2019-06-21 PROCEDURE — 96374 THER/PROPH/DIAG INJ IV PUSH: CPT

## 2019-06-21 PROCEDURE — 6370000000 HC RX 637 (ALT 250 FOR IP): Performed by: FAMILY MEDICINE

## 2019-06-21 PROCEDURE — 2500000003 HC RX 250 WO HCPCS: Performed by: SURGERY

## 2019-06-21 PROCEDURE — 7100000000 HC PACU RECOVERY - FIRST 15 MIN: Performed by: SURGERY

## 2019-06-21 PROCEDURE — 6360000004 HC RX CONTRAST MEDICATION: Performed by: FAMILY MEDICINE

## 2019-06-21 PROCEDURE — 6360000002 HC RX W HCPCS: Performed by: ANESTHESIOLOGY

## 2019-06-21 PROCEDURE — 3700000000 HC ANESTHESIA ATTENDED CARE: Performed by: SURGERY

## 2019-06-21 PROCEDURE — 96375 TX/PRO/DX INJ NEW DRUG ADDON: CPT

## 2019-06-21 PROCEDURE — 87086 URINE CULTURE/COLONY COUNT: CPT

## 2019-06-21 PROCEDURE — 2500000003 HC RX 250 WO HCPCS: Performed by: NURSE ANESTHETIST, CERTIFIED REGISTERED

## 2019-06-21 PROCEDURE — 93005 ELECTROCARDIOGRAM TRACING: CPT | Performed by: FAMILY MEDICINE

## 2019-06-21 PROCEDURE — 93010 ELECTROCARDIOGRAM REPORT: CPT | Performed by: INTERNAL MEDICINE

## 2019-06-21 PROCEDURE — 85379 FIBRIN DEGRADATION QUANT: CPT

## 2019-06-21 PROCEDURE — 99285 EMERGENCY DEPT VISIT HI MDM: CPT

## 2019-06-21 PROCEDURE — 47563 LAPARO CHOLECYSTECTOMY/GRAPH: CPT | Performed by: SURGERY

## 2019-06-21 PROCEDURE — 99222 1ST HOSP IP/OBS MODERATE 55: CPT | Performed by: SURGERY

## 2019-06-21 PROCEDURE — 2580000003 HC RX 258: Performed by: NURSE ANESTHETIST, CERTIFIED REGISTERED

## 2019-06-21 PROCEDURE — BF121ZZ FLUOROSCOPY OF GALLBLADDER USING LOW OSMOLAR CONTRAST: ICD-10-PCS | Performed by: SURGERY

## 2019-06-21 PROCEDURE — 6360000004 HC RX CONTRAST MEDICATION: Performed by: SURGERY

## 2019-06-21 PROCEDURE — 74177 CT ABD & PELVIS W/CONTRAST: CPT

## 2019-06-21 PROCEDURE — 81001 URINALYSIS AUTO W/SCOPE: CPT

## 2019-06-21 PROCEDURE — 83690 ASSAY OF LIPASE: CPT

## 2019-06-21 PROCEDURE — C1894 INTRO/SHEATH, NON-LASER: HCPCS | Performed by: SURGERY

## 2019-06-21 PROCEDURE — 6360000002 HC RX W HCPCS: Performed by: FAMILY MEDICINE

## 2019-06-21 PROCEDURE — 0FT44ZZ RESECTION OF GALLBLADDER, PERCUTANEOUS ENDOSCOPIC APPROACH: ICD-10-PCS | Performed by: SURGERY

## 2019-06-21 PROCEDURE — 3700000001 HC ADD 15 MINUTES (ANESTHESIA): Performed by: SURGERY

## 2019-06-21 PROCEDURE — 80053 COMPREHEN METABOLIC PANEL: CPT

## 2019-06-21 PROCEDURE — 2720000010 HC SURG SUPPLY STERILE: Performed by: SURGERY

## 2019-06-21 PROCEDURE — 7100000001 HC PACU RECOVERY - ADDTL 15 MIN: Performed by: SURGERY

## 2019-06-21 PROCEDURE — 3600000014 HC SURGERY LEVEL 4 ADDTL 15MIN: Performed by: SURGERY

## 2019-06-21 RX ORDER — BUPIVACAINE HYDROCHLORIDE AND EPINEPHRINE 5; 5 MG/ML; UG/ML
INJECTION, SOLUTION EPIDURAL; INTRACAUDAL; PERINEURAL
Status: COMPLETED | OUTPATIENT
Start: 2019-06-21 | End: 2019-06-21

## 2019-06-21 RX ORDER — POTASSIUM CHLORIDE 20 MEQ/1
80 TABLET, EXTENDED RELEASE ORAL ONCE
Status: COMPLETED | OUTPATIENT
Start: 2019-06-21 | End: 2019-06-21

## 2019-06-21 RX ORDER — GLYCOPYRROLATE 0.2 MG/ML
INJECTION INTRAMUSCULAR; INTRAVENOUS PRN
Status: DISCONTINUED | OUTPATIENT
Start: 2019-06-21 | End: 2019-06-21 | Stop reason: SDUPTHER

## 2019-06-21 RX ORDER — SODIUM CHLORIDE 9 MG/ML
INJECTION, SOLUTION INTRAVENOUS CONTINUOUS PRN
Status: DISCONTINUED | OUTPATIENT
Start: 2019-06-21 | End: 2019-06-21 | Stop reason: SDUPTHER

## 2019-06-21 RX ORDER — 0.9 % SODIUM CHLORIDE 0.9 %
1000 INTRAVENOUS SOLUTION INTRAVENOUS ONCE
Status: COMPLETED | OUTPATIENT
Start: 2019-06-21 | End: 2019-06-21

## 2019-06-21 RX ORDER — SODIUM CHLORIDE 0.9 % (FLUSH) 0.9 %
10 SYRINGE (ML) INJECTION EVERY 12 HOURS SCHEDULED
Status: DISCONTINUED | OUTPATIENT
Start: 2019-06-21 | End: 2019-06-22 | Stop reason: HOSPADM

## 2019-06-21 RX ORDER — HYDROMORPHONE HCL 110MG/55ML
0.5 PATIENT CONTROLLED ANALGESIA SYRINGE INTRAVENOUS EVERY 5 MIN PRN
Status: DISCONTINUED | OUTPATIENT
Start: 2019-06-21 | End: 2019-06-21 | Stop reason: HOSPADM

## 2019-06-21 RX ORDER — KETOROLAC TROMETHAMINE 30 MG/ML
INJECTION, SOLUTION INTRAMUSCULAR; INTRAVENOUS PRN
Status: DISCONTINUED | OUTPATIENT
Start: 2019-06-21 | End: 2019-06-21 | Stop reason: SDUPTHER

## 2019-06-21 RX ORDER — LIDOCAINE HYDROCHLORIDE 20 MG/ML
INJECTION, SOLUTION EPIDURAL; INFILTRATION; INTRACAUDAL; PERINEURAL PRN
Status: DISCONTINUED | OUTPATIENT
Start: 2019-06-21 | End: 2019-06-21 | Stop reason: SDUPTHER

## 2019-06-21 RX ORDER — HYDROMORPHONE HCL 110MG/55ML
0.25 PATIENT CONTROLLED ANALGESIA SYRINGE INTRAVENOUS EVERY 5 MIN PRN
Status: DISCONTINUED | OUTPATIENT
Start: 2019-06-21 | End: 2019-06-21 | Stop reason: HOSPADM

## 2019-06-21 RX ORDER — HYDROMORPHONE HYDROCHLORIDE 1 MG/ML
1 INJECTION, SOLUTION INTRAMUSCULAR; INTRAVENOUS; SUBCUTANEOUS
Status: DISCONTINUED | OUTPATIENT
Start: 2019-06-21 | End: 2019-06-22 | Stop reason: HOSPADM

## 2019-06-21 RX ORDER — MAGNESIUM HYDROXIDE 1200 MG/15ML
LIQUID ORAL CONTINUOUS PRN
Status: COMPLETED | OUTPATIENT
Start: 2019-06-21 | End: 2019-06-21

## 2019-06-21 RX ORDER — MORPHINE SULFATE 4 MG/ML
INJECTION, SOLUTION INTRAMUSCULAR; INTRAVENOUS
Status: DISPENSED
Start: 2019-06-21 | End: 2019-06-21

## 2019-06-21 RX ORDER — ONDANSETRON 2 MG/ML
INJECTION INTRAMUSCULAR; INTRAVENOUS PRN
Status: DISCONTINUED | OUTPATIENT
Start: 2019-06-21 | End: 2019-06-21 | Stop reason: SDUPTHER

## 2019-06-21 RX ORDER — DEXAMETHASONE SODIUM PHOSPHATE 4 MG/ML
INJECTION, SOLUTION INTRA-ARTICULAR; INTRALESIONAL; INTRAMUSCULAR; INTRAVENOUS; SOFT TISSUE PRN
Status: DISCONTINUED | OUTPATIENT
Start: 2019-06-21 | End: 2019-06-21 | Stop reason: SDUPTHER

## 2019-06-21 RX ORDER — ESMOLOL HYDROCHLORIDE 10 MG/ML
INJECTION INTRAVENOUS PRN
Status: DISCONTINUED | OUTPATIENT
Start: 2019-06-21 | End: 2019-06-21 | Stop reason: SDUPTHER

## 2019-06-21 RX ORDER — ROCURONIUM BROMIDE 10 MG/ML
INJECTION, SOLUTION INTRAVENOUS PRN
Status: DISCONTINUED | OUTPATIENT
Start: 2019-06-21 | End: 2019-06-21 | Stop reason: SDUPTHER

## 2019-06-21 RX ORDER — MORPHINE SULFATE 4 MG/ML
4 INJECTION, SOLUTION INTRAMUSCULAR; INTRAVENOUS EVERY 30 MIN PRN
Status: DISCONTINUED | OUTPATIENT
Start: 2019-06-21 | End: 2019-06-22

## 2019-06-21 RX ORDER — OXYCODONE HYDROCHLORIDE AND ACETAMINOPHEN 5; 325 MG/1; MG/1
2 TABLET ORAL EVERY 4 HOURS PRN
Status: DISCONTINUED | OUTPATIENT
Start: 2019-06-21 | End: 2019-06-21 | Stop reason: HOSPADM

## 2019-06-21 RX ORDER — ONDANSETRON 2 MG/ML
4 INJECTION INTRAMUSCULAR; INTRAVENOUS EVERY 6 HOURS PRN
Status: DISCONTINUED | OUTPATIENT
Start: 2019-06-21 | End: 2019-06-22 | Stop reason: HOSPADM

## 2019-06-21 RX ORDER — FENTANYL CITRATE 50 UG/ML
INJECTION, SOLUTION INTRAMUSCULAR; INTRAVENOUS PRN
Status: DISCONTINUED | OUTPATIENT
Start: 2019-06-21 | End: 2019-06-21 | Stop reason: SDUPTHER

## 2019-06-21 RX ORDER — SODIUM CHLORIDE 0.9 % (FLUSH) 0.9 %
10 SYRINGE (ML) INJECTION PRN
Status: DISCONTINUED | OUTPATIENT
Start: 2019-06-21 | End: 2019-06-22 | Stop reason: HOSPADM

## 2019-06-21 RX ORDER — HYDROCODONE BITARTRATE AND ACETAMINOPHEN 5; 325 MG/1; MG/1
1 TABLET ORAL ONCE
Status: COMPLETED | OUTPATIENT
Start: 2019-06-21 | End: 2019-06-21

## 2019-06-21 RX ORDER — SODIUM CHLORIDE, SODIUM LACTATE, POTASSIUM CHLORIDE, CALCIUM CHLORIDE 600; 310; 30; 20 MG/100ML; MG/100ML; MG/100ML; MG/100ML
INJECTION, SOLUTION INTRAVENOUS CONTINUOUS PRN
Status: COMPLETED | OUTPATIENT
Start: 2019-06-21 | End: 2019-06-21

## 2019-06-21 RX ORDER — PROPOFOL 10 MG/ML
INJECTION, EMULSION INTRAVENOUS PRN
Status: DISCONTINUED | OUTPATIENT
Start: 2019-06-21 | End: 2019-06-21 | Stop reason: SDUPTHER

## 2019-06-21 RX ORDER — KETOROLAC TROMETHAMINE 30 MG/ML
30 INJECTION, SOLUTION INTRAMUSCULAR; INTRAVENOUS ONCE
Status: COMPLETED | OUTPATIENT
Start: 2019-06-21 | End: 2019-06-21

## 2019-06-21 RX ORDER — HYDROCODONE BITARTRATE AND ACETAMINOPHEN 5; 325 MG/1; MG/1
1 TABLET ORAL
Status: DISCONTINUED | OUTPATIENT
Start: 2019-06-21 | End: 2019-06-21 | Stop reason: HOSPADM

## 2019-06-21 RX ORDER — CIPROFLOXACIN 2 MG/ML
400 INJECTION, SOLUTION INTRAVENOUS EVERY 12 HOURS
Status: DISCONTINUED | OUTPATIENT
Start: 2019-06-21 | End: 2019-06-22 | Stop reason: HOSPADM

## 2019-06-21 RX ORDER — SUCCINYLCHOLINE CHLORIDE 20 MG/ML
INJECTION INTRAMUSCULAR; INTRAVENOUS PRN
Status: DISCONTINUED | OUTPATIENT
Start: 2019-06-21 | End: 2019-06-21 | Stop reason: SDUPTHER

## 2019-06-21 RX ORDER — SODIUM CHLORIDE 9 MG/ML
INJECTION, SOLUTION INTRAVENOUS CONTINUOUS
Status: DISCONTINUED | OUTPATIENT
Start: 2019-06-21 | End: 2019-06-22

## 2019-06-21 RX ORDER — DEXTROSE, SODIUM CHLORIDE, AND POTASSIUM CHLORIDE 5; .45; .15 G/100ML; G/100ML; G/100ML
INJECTION INTRAVENOUS CONTINUOUS
Status: DISCONTINUED | OUTPATIENT
Start: 2019-06-21 | End: 2019-06-22 | Stop reason: HOSPADM

## 2019-06-21 RX ORDER — OXYCODONE HYDROCHLORIDE AND ACETAMINOPHEN 5; 325 MG/1; MG/1
1-2 TABLET ORAL EVERY 4 HOURS PRN
Qty: 15 TABLET | Refills: 0 | Status: SHIPPED | OUTPATIENT
Start: 2019-06-21 | End: 2019-06-24

## 2019-06-21 RX ORDER — ONDANSETRON 2 MG/ML
4 INJECTION INTRAMUSCULAR; INTRAVENOUS
Status: DISCONTINUED | OUTPATIENT
Start: 2019-06-21 | End: 2019-06-21 | Stop reason: HOSPADM

## 2019-06-21 RX ORDER — LIDOCAINE HYDROCHLORIDE 10 MG/ML
1 INJECTION, SOLUTION EPIDURAL; INFILTRATION; INTRACAUDAL; PERINEURAL
Status: DISCONTINUED | OUTPATIENT
Start: 2019-06-21 | End: 2019-06-21 | Stop reason: HOSPADM

## 2019-06-21 RX ADMIN — SUGAMMADEX 200 MG: 100 INJECTION, SOLUTION INTRAVENOUS at 15:48

## 2019-06-21 RX ADMIN — ESMOLOL HYDROCHLORIDE 10 MG: 10 INJECTION, SOLUTION INTRAVENOUS at 15:21

## 2019-06-21 RX ADMIN — PROPOFOL 150 MG: 10 INJECTION, EMULSION INTRAVENOUS at 15:00

## 2019-06-21 RX ADMIN — LIDOCAINE HYDROCHLORIDE 100 MG: 20 INJECTION, SOLUTION EPIDURAL; INFILTRATION; INTRACAUDAL; PERINEURAL at 15:00

## 2019-06-21 RX ADMIN — KETOROLAC TROMETHAMINE 30 MG: 30 INJECTION, SOLUTION INTRAMUSCULAR at 04:21

## 2019-06-21 RX ADMIN — CIPROFLOXACIN 400 MG: 2 INJECTION, SOLUTION INTRAVENOUS at 20:35

## 2019-06-21 RX ADMIN — HYDROMORPHONE HYDROCHLORIDE 1 MG: 1 INJECTION, SOLUTION INTRAMUSCULAR; INTRAVENOUS; SUBCUTANEOUS at 19:55

## 2019-06-21 RX ADMIN — MAGNESIUM CITRATE 150 ML: 1.75 LIQUID ORAL at 06:06

## 2019-06-21 RX ADMIN — IOPAMIDOL 75 ML: 755 INJECTION, SOLUTION INTRAVENOUS at 04:52

## 2019-06-21 RX ADMIN — SODIUM CHLORIDE: 9 INJECTION, SOLUTION INTRAVENOUS at 14:52

## 2019-06-21 RX ADMIN — SODIUM CHLORIDE: 9 INJECTION, SOLUTION INTRAVENOUS at 14:27

## 2019-06-21 RX ADMIN — GLYCOPYRROLATE 0.2 MG: 0.2 INJECTION INTRAMUSCULAR; INTRAVENOUS at 15:07

## 2019-06-21 RX ADMIN — ROCURONIUM BROMIDE 10 MG: 10 INJECTION, SOLUTION INTRAVENOUS at 15:00

## 2019-06-21 RX ADMIN — SODIUM CHLORIDE 1000 ML: 9 INJECTION, SOLUTION INTRAVENOUS at 03:12

## 2019-06-21 RX ADMIN — KETOROLAC TROMETHAMINE 30 MG: 30 INJECTION, SOLUTION INTRAMUSCULAR; INTRAVENOUS at 15:39

## 2019-06-21 RX ADMIN — DEXAMETHASONE SODIUM PHOSPHATE 8 MG: 4 INJECTION, SOLUTION INTRAMUSCULAR; INTRAVENOUS at 15:07

## 2019-06-21 RX ADMIN — CIPROFLOXACIN 400 MG: 2 INJECTION, SOLUTION INTRAVENOUS at 11:40

## 2019-06-21 RX ADMIN — HYOSCYAMINE SULFATE 250 MCG: 0.12 TABLET ORAL; SUBLINGUAL at 06:06

## 2019-06-21 RX ADMIN — MORPHINE SULFATE 4 MG: 4 INJECTION INTRAVENOUS at 04:30

## 2019-06-21 RX ADMIN — FENTANYL CITRATE 50 MCG: 50 INJECTION, SOLUTION INTRAMUSCULAR; INTRAVENOUS at 15:12

## 2019-06-21 RX ADMIN — POTASSIUM CHLORIDE 80 MEQ: 1500 TABLET, EXTENDED RELEASE ORAL at 03:36

## 2019-06-21 RX ADMIN — POTASSIUM CHLORIDE, DEXTROSE MONOHYDRATE AND SODIUM CHLORIDE: 150; 5; 450 INJECTION, SOLUTION INTRAVENOUS at 11:40

## 2019-06-21 RX ADMIN — ONDANSETRON 4 MG: 2 INJECTION INTRAMUSCULAR; INTRAVENOUS at 15:07

## 2019-06-21 RX ADMIN — HYDROMORPHONE HYDROCHLORIDE 0.25 MG: 2 INJECTION, SOLUTION INTRAMUSCULAR; INTRAVENOUS; SUBCUTANEOUS at 16:32

## 2019-06-21 RX ADMIN — Medication 10 ML: at 19:55

## 2019-06-21 RX ADMIN — HYDROMORPHONE HYDROCHLORIDE 0.25 MG: 2 INJECTION, SOLUTION INTRAMUSCULAR; INTRAVENOUS; SUBCUTANEOUS at 16:49

## 2019-06-21 RX ADMIN — SODIUM CHLORIDE: 9 INJECTION, SOLUTION INTRAVENOUS at 15:48

## 2019-06-21 RX ADMIN — FENTANYL CITRATE 100 MCG: 50 INJECTION, SOLUTION INTRAMUSCULAR; INTRAVENOUS at 14:57

## 2019-06-21 RX ADMIN — ROCURONIUM BROMIDE 30 MG: 10 INJECTION, SOLUTION INTRAVENOUS at 15:07

## 2019-06-21 RX ADMIN — SUCCINYLCHOLINE CHLORIDE 140 MG: 20 INJECTION, SOLUTION INTRAMUSCULAR; INTRAVENOUS at 15:00

## 2019-06-21 RX ADMIN — HYDROCODONE BITARTRATE AND ACETAMINOPHEN 1 TABLET: 5; 325 TABLET ORAL at 04:21

## 2019-06-21 RX ADMIN — SODIUM CHLORIDE 1000 ML: 9 INJECTION, SOLUTION INTRAVENOUS at 04:25

## 2019-06-21 ASSESSMENT — PULMONARY FUNCTION TESTS
PIF_VALUE: 23
PIF_VALUE: 15
PIF_VALUE: 1
PIF_VALUE: 24
PIF_VALUE: 23
PIF_VALUE: 17
PIF_VALUE: 13
PIF_VALUE: 5
PIF_VALUE: 1
PIF_VALUE: 21
PIF_VALUE: 23
PIF_VALUE: 2
PIF_VALUE: 18
PIF_VALUE: 4
PIF_VALUE: 2
PIF_VALUE: 21
PIF_VALUE: 21
PIF_VALUE: 15
PIF_VALUE: 23
PIF_VALUE: 2
PIF_VALUE: 16
PIF_VALUE: 22
PIF_VALUE: 19
PIF_VALUE: 23
PIF_VALUE: 21
PIF_VALUE: 0
PIF_VALUE: 15
PIF_VALUE: 22
PIF_VALUE: 3
PIF_VALUE: 21
PIF_VALUE: 2
PIF_VALUE: 2
PIF_VALUE: 23
PIF_VALUE: 0
PIF_VALUE: 22
PIF_VALUE: 21
PIF_VALUE: 14
PIF_VALUE: 22
PIF_VALUE: 22
PIF_VALUE: 21
PIF_VALUE: 3
PIF_VALUE: 21
PIF_VALUE: 2
PIF_VALUE: 3
PIF_VALUE: 1
PIF_VALUE: 27
PIF_VALUE: 15
PIF_VALUE: 23
PIF_VALUE: 2
PIF_VALUE: 5
PIF_VALUE: 13
PIF_VALUE: 4
PIF_VALUE: 2
PIF_VALUE: 14
PIF_VALUE: 16
PIF_VALUE: 23
PIF_VALUE: 15
PIF_VALUE: 15
PIF_VALUE: 21
PIF_VALUE: 15
PIF_VALUE: 2
PIF_VALUE: 23
PIF_VALUE: 14
PIF_VALUE: 20
PIF_VALUE: 23
PIF_VALUE: 14
PIF_VALUE: 0
PIF_VALUE: 15
PIF_VALUE: 7
PIF_VALUE: 17
PIF_VALUE: 22
PIF_VALUE: 15
PIF_VALUE: 2
PIF_VALUE: 22

## 2019-06-21 ASSESSMENT — PAIN DESCRIPTION - LOCATION
LOCATION: ABDOMEN

## 2019-06-21 ASSESSMENT — PAIN DESCRIPTION - PAIN TYPE
TYPE: SURGICAL PAIN

## 2019-06-21 ASSESSMENT — ENCOUNTER SYMPTOMS
GASTROINTESTINAL NEGATIVE: 1
EYES NEGATIVE: 1
EYE PAIN: 0
WHEEZING: 0
NAUSEA: 0
RESPIRATORY NEGATIVE: 1
VOMITING: 0
ALLERGIC/IMMUNOLOGIC NEGATIVE: 1
CHEST TIGHTNESS: 0
COLOR CHANGE: 0
COUGH: 0
SHORTNESS OF BREATH: 1
DIARRHEA: 0
SHORTNESS OF BREATH: 0
BACK PAIN: 0
ABDOMINAL PAIN: 0

## 2019-06-21 ASSESSMENT — PAIN SCALES - GENERAL
PAINLEVEL_OUTOF10: 6
PAINLEVEL_OUTOF10: 2
PAINLEVEL_OUTOF10: 9
PAINLEVEL_OUTOF10: 6
PAINLEVEL_OUTOF10: 4
PAINLEVEL_OUTOF10: 9

## 2019-06-21 ASSESSMENT — PAIN - FUNCTIONAL ASSESSMENT
PAIN_FUNCTIONAL_ASSESSMENT: PREVENTS OR INTERFERES SOME ACTIVE ACTIVITIES AND ADLS
PAIN_FUNCTIONAL_ASSESSMENT: 0-10

## 2019-06-21 ASSESSMENT — PAIN DESCRIPTION - DESCRIPTORS: DESCRIPTORS: DISCOMFORT

## 2019-06-21 ASSESSMENT — PAIN DESCRIPTION - PROGRESSION: CLINICAL_PROGRESSION: GRADUALLY WORSENING

## 2019-06-21 ASSESSMENT — PAIN DESCRIPTION - ONSET: ONSET: PROGRESSIVE

## 2019-06-21 ASSESSMENT — PAIN DESCRIPTION - FREQUENCY: FREQUENCY: INTERMITTENT

## 2019-06-21 NOTE — PROGRESS NOTES
Pt resting quietly in bed with eyes closed, awakens to voice.  #[id number] used to communicate with pt. Pt medicated per [facility] for pain. VSS, O2 sats 100% on 3 L NC. Incisions on abdomen dry and intact, abdomen soft. Ice pack in place. Pt seen by anesthesia, phase 1 criteria met. Will transfer pt to 5T.

## 2019-06-21 NOTE — ED NOTES
Pt to [address] in wheelchair via ED tech     Belia Vargas RN  06/21/19 2415

## 2019-06-21 NOTE — ED PROVIDER NOTES
I received this patient in signout from Dr. Pat Primrose. We discussed the patient's initial history, physical, workup thus far, and medical decision making to this point. Briefly, Peter Wilson is a 41 y.o. female  who presents to the ED complaining of colicky type pain mostly in the epigastrium. Initial history/exam also pertinent for +ddimer, but neg ctpa, abdominal CT showing possible biliary etiology. Pending studies at time of signout include: RUQ u/s. The 12 lead EKG was interpreted by me as follows:  Rate: bradycardia with a rate of 53  Rhythm: sinus  Axis: normal  Intervals: normal WI, narrow QRS, normal QTc  ST segments: no ST elevations or depressions  T waves: no abnormal inversions  Non-specific T wave changes: not present  Prior EKG comparison: No prior is currently available for comparison    The patient will be reassessed after workup above is completed. Anticipated disposition at time of signout is pending imaging. ED COURSE/MDM  I introduced myself to the patient and performed my initial assessment on Peter Wilson. There is no significant change in the patient's history from what is documented initially by Dr. Pat Primrose  after my evaluation. Old records reviewed. Labs and imaging reviewed and results discussed with patient. Since time of sign out, the patient's ED workup was notable for some symptom improvement. On my exam she does have some epigastric abdominal discomfort but no [place] sign or generalized peritonitis. Ultrasound imaging demonstrates cholelithiasis with the gallbladder neck stone without acute cholecystitis and mild dilatation of the common bile duct. The patient is afebrile with unremarkable labs. I consulted and spoke with Dr. Xenia Nieves from surgery about the patient's ED history, physical, workup, and course so far. Recommendations from this consultant included admit.     During the patient's ED course, the patient was given: Medications   morphine injection 4 mg (4 mg Intravenous Given 6/21/19 0430)   morphine 4 MG/ML injection (has no administration in time range)   0.9 % sodium chloride bolus (0 mLs Intravenous Stopped 6/21/19 0423)   potassium chloride (KLOR-CON M) extended release tablet 80 mEq (80 mEq Oral Given 6/21/19 0336)   0.9 % sodium chloride bolus (0 mLs Intravenous Stopped 6/21/19 0648)   ketorolac (TORADOL) injection 30 mg (30 mg Intravenous Given 6/21/19 0421)   HYDROcodone-acetaminophen (NORCO) 5-325 MG per tablet 1 tablet (1 tablet Oral Given 6/21/19 0421)   iopamidol (ISOVUE-370) 76 % injection 75 mL (75 mLs Intravenous Given 6/21/19 0452)   hyoscyamine (LEVSIN/SL) sublingual tablet 250 mcg (250 mcg Sublingual Given 6/21/19 0606)   magnesium citrate solution 150 mL (150 mLs Oral Given 6/21/19 0606)        CLINICAL IMPRESSION  1. Colicky abdominal pain    2. Hypokalemia    3. Constipation, unspecified constipation type    4. Gallstones        Blood pressure (!) 93/54, pulse 59, temperature 97.2 °F (36.2 °C), temperature source Infrared, resp. rate 16, last menstrual period 06/14/2019, SpO2 100 %, unknown if currently breastfeeding. DISPOSITION  Jodi Alba was admitted in fair condition. I have discussed the findings of today's workup with the patient and addressed the patient's questions and concerns. The plan is to admit to the hospital at this time under the surgery service. I spoke with Dr. Annie Solano who accepted the patient and will take over the patient's care. The patient is agreeable with this plan. This chart was created using Dragon dictation software. Efforts were made by me to ensure accuracy, however some errors may be present due to limitations of this technology.         Tone Casiano MD  06/21/19 0081

## 2019-06-21 NOTE — BRIEF OP NOTE
Brief Postoperative Note  ______________________________________________________________    Patient: Jerome Gutierrez  YOB: 1975  MRN: [id number]  Date of Procedure: 6/21/2019    Pre-Op Diagnosis: Cholecystitis    Post-Op Diagnosis: Same       Procedure(s):  LAPAROSCOPIC CHOLECYSTECTOMY WITH INTRAOPERATIVE CHOLANGIOGRAM    Anesthesia: General    Surgeon(s):  Cody Ahn MD    Assistant: Aicha Ward    Estimated Blood Loss (mL): less than 50     Complications: None    Specimens:   ID Type Source Tests Collected by Time Destination   A : A) GALLBLADDER Tissue Gallbladder SURGICAL PATHOLOGY Cody Ahn MD 6/21/2019 1431        Implants:  * No implants in log *      Drains:   [REMOVED] Urethral Catheter Straight-tip; Non-latex; Double-lumen 16 fr (Removed)       Findings: cholelithiasis, dilated bile ducts without filling defect    Cody Ahn MD  Date: 6/21/2019  Time: 3:43 PM

## 2019-06-21 NOTE — PROGRESS NOTES
Pt to preop area from floor. Pt is Ch[place] speaking language line used for information.  #[id number]. Pt educated on preop area. Pt verbalizes understanding.

## 2019-06-21 NOTE — ANESTHESIA PRE PROCEDURE
Department of Anesthesiology  Preprocedure Note       Name:  Kush Vargas   Age:  41 y.o.  :  1975                                          MRN:  [id number]         Date:  2019      Surgeon: Yemi Freed):  Tammy Yang MD    Procedure: LAPAROSCOPIC CHOLECYSTECTOMY WITH INTRAOPERATIVE CHOLANGIOGRAM (N/A )    Medications prior to admission:   Prior to Admission medications    Not on File       Current medications:    Current Facility-Administered Medications   Medication Dose Route Frequency Provider Last Rate Last Dose    morphine injection 4 mg  4 mg Intravenous Q30 Min PRN Chip Lawson MD   4 mg at 19 0430    dextrose 5 % and 0.45 % NaCl with KCl 20 mEq infusion   Intravenous Continuous Tammy Yang  mL/hr at 19 1140      sodium chloride flush 0.9 % injection 10 mL  10 mL Intravenous 2 times per day Tammy Yang MD        sodium chloride flush 0.9 % injection 10 mL  10 mL Intravenous PRN Tammy Yang MD        HYDROmorphone HCl PF (DILAUDID) injection 1 mg  1 mg Intravenous Q2H PRN Tammy Yang MD        ondansetron [facility]) injection 4 mg  4 mg Intravenous Q6H PRN Tammy Yang MD        ciprofloxacin (CIPRO) IVPB 400 mg  400 mg Intravenous Q12H Tammy Yang  mL/hr at 19 1140 400 mg at 19 1140    0.9 % sodium chloride infusion   Intravenous Continuous Cary Shah  mL/hr at 19 1427      lidocaine PF 1 % injection 1 mL  1 mL Intradermal Once PRN Cary Shah MD        HYDROcodone-acetaminophen (NORCO) 5-325 MG per tablet 1 tablet  1 tablet Oral Once PRN Cary Shah MD        ondansetron [facility]) injection 4 mg  4 mg Intravenous Once PRN Cary Shah MD        HYDROmorphone (DILAUDID) injection 0.25 mg  0.25 mg Intravenous Q5 Min PRN Cary Shah MD        HYDROmorphone (DILAUDID) injection 0.5 mg  0.5 mg Intravenous Q5 Min PRN Leticia Larose Kings Duke MD        sodium chloride 0.9 % irrigation    Continuous PRN Lila Lester MD   500 mL at 06/21/19 1429    lactated ringers infusion    Continuous PRN Lila Lester MD   1,000 mL at 06/21/19 1430       Allergies:  No Known Allergies    Problem List:    Patient Active Problem List   Diagnosis Code    Tuvaluan speaking patient Abigail Alberto Late prenatal care O09.30    Rubella non-immune status, antepartum O99.89, Z29.3    AMA (advanced maternal age) multigravida 35+ O12.46    Grand multiparity Z64.1    Abdominal pain R10.9    NST (non-stress test) nonreactive O28.8    Normal labor and delivery O80    Normal labor O80, Z37.9    Threatened labor O47.9    Vaginal delivery O80    Acute cholecystitis K81.0       Past Medical History:        Diagnosis Date    AMA (advanced maternal age) multigravida 35+     Anemia     Pt taking PO Iron.  SGA (small for gestational age)     (prior pregnancy); EFW 2473g on 4/3/18.  18%. Past Surgical History:  History reviewed. No pertinent surgical history.     Social History:    Social History     Tobacco Use    Smoking status: Never Smoker    Smokeless tobacco: Never Used   Substance Use Topics    Alcohol use: No     Alcohol/week: 0.0 oz                                Counseling given: Not Answered      Vital Signs (Current):   Vitals:    06/21/19 0830 06/21/19 0945 06/21/19 1021 06/21/19 1422   BP: (!) 95/44 (!) 116/52 (!) 99/54 111/69   Pulse: 51  52 55   Resp:  16 16 18   Temp:  98.4 °F (36.9 °C) 98.3 °F (36.8 °C) 97.1 °F (36.2 °C)   TempSrc:  Oral Oral Temporal   SpO2: 99%  99% 100%   Height:    5' 3\" (1.6 m)                                              BP Readings from Last 3 Encounters:   06/21/19 111/69   03/29/19 (!) 93/54   03/26/19 105/68       NPO Status: Time of last liquid consumption: 1200(sips with medication)                        Time of last solid consumption: 2230                        Date of last liquid consumption: 06/21/19                        Date of last solid food consumption: 06/20/19    BMI:   Wt Readings from Last 3 Encounters:   03/27/19 146 lb (66.2 kg)   03/26/19 146 lb (66.2 kg)   03/19/19 145 lb (65.8 kg)     Body mass index is 25.86 kg/m². CBC:   Lab Results   Component Value Date    WBC 7.2 06/21/2019    RBC 4.15 06/21/2019    HGB 12.7 06/21/2019    HCT 38.2 06/21/2019    MCV 92.2 06/21/2019    RDW 13.0 06/21/2019     06/21/2019       CMP:   Lab Results   Component Value Date     06/21/2019    K 3.2 06/21/2019     06/21/2019    CO2 26 06/21/2019    BUN 12 06/21/2019    CREATININE 0.6 06/21/2019    GFRAA >60 06/21/2019    AGRATIO 1.3 06/21/2019    LABGLOM >60 06/21/2019    GLUCOSE 126 06/21/2019    PROT 7.2 06/21/2019    CALCIUM 9.4 06/21/2019    BILITOT <0.2 06/21/2019    ALKPHOS 123 06/21/2019    AST 46 06/21/2019    ALT 37 06/21/2019       POC Tests: No results for input(s): POCGLU, POCNA, POCK, POCCL, POCBUN, POCHEMO, POCHCT in the last 72 hours. Coags: No results found for: PROTIME, INR, APTT    HCG (If Applicable): No results found for: PREGTESTUR, PREGSERUM, HCG, HCGQUANT     ABGs: No results found for: PHART, PO2ART, VMZ6QEA, RGL7YIK, BEART, W4GRWCFQ     Type & Screen (If Applicable):  No results found for: LABABO, [address]    Anesthesia Evaluation  Patient summary reviewed no history of anesthetic complications:   Airway: Mallampati: II  TM distance: >3 FB   Neck ROM: full  Mouth opening: > = 3 FB Dental: normal exam         Pulmonary:Negative Pulmonary ROS breath sounds clear to auscultation                             Cardiovascular:  Exercise tolerance: good (>4 METS),       (-) CABG/stent, dysrhythmias and  angina      Rhythm: regular  Rate: normal                    Neuro/Psych:      (-) seizures, TIA and CVA           GI/Hepatic/Renal:        (-) GERD       Endo/Other:                      ROS comment: No family history of problems with GA.   No personal history of problems with GA Abdominal:           Vascular:                                      Anesthesia Plan      general     ASA 1     (RSI with cricoid pressure. Medical history and informed consent for anesthesia taken with the help of language line . All questions answered.)  Induction: intravenous. MIPS: Postoperative opioids intended, Prophylactic antiemetics administered and Postoperative trial extubation. Anesthetic plan and risks discussed with patient. Use of blood products discussed with patient whom consented to blood products. Plan discussed with CRNA.                   Michael Will MD   6/21/2019

## 2019-06-21 NOTE — ED NOTES
Pt. updated on plan of care at this time. Pt. displaying no s/s of distress. Bed locked with siderails up x2 with call light within reach. No other needs verbalized at this time.        Scott Moreno RN  06/21/19 2749

## 2019-06-21 NOTE — ED NOTES
Patient resting comfortably with no signs of distress. Pt. States she is pain free. Denies any needs at this time. Bed locked and in lowest position with both side rails raised. Call light within reach.         Alea Fierro RN  06/21/19 2875

## 2019-06-21 NOTE — PROGRESS NOTES
Pt returned to room 5582 from PACU. Sleeping, rouses to voice but is quickly back to sleep. o2 sat is 100% on 3 liters o2. Will continue to monitor.

## 2019-06-21 NOTE — H&P
Efrain Jordan is an 41 y.o.  female. CC-chest pain    HPI: 41year old  female who presented to the hospital overnight with complaints of chest and epigastric abdominal pain which woke her from sleep. The pain was described as dull and severe. Associated symptoms include nausea. No history of vomiting, fevers, chills, change in bowel habits or urinary symptoms. CAT scan of the abdomen and pelvis was unremarkable other than showing gallstones. A subsequent right upper quadrant ultrasound also showed gallstones. There are no signs of acute cholecystitis but her while ducts were dilated. Past Medical History:   Diagnosis Date    AMA (advanced maternal age) multigravida 35+     Anemia     Pt taking PO Iron.  SGA (small for gestational age)     (prior pregnancy); EFW 2473g on 4/3/18.  18%. History reviewed. No pertinent surgical history. Social History     Socioeconomic History    Marital status:       Spouse name: Chris Chang    Number of children: 5    Years of education: 6th    Highest education level: Not on file   Occupational History    Occupation: unemployed   Social Needs    Financial resource strain: Not on file    Food insecurity:     Worry: Not on file     Inability: Not on file   [place] needs:     Medical: Not on file     Non-medical: Not on file   Tobacco Use    Smoking status: Never Smoker    Smokeless tobacco: Never Used   Substance and Sexual Activity    Alcohol use: No     Alcohol/week: 0.0 oz    Drug use: No    Sexual activity: Yes     Partners: Male   Lifestyle    Physical activity:     Days per week: Not on file     Minutes per session: Not on file    Stress: Not on file   Relationships    Social connections:     Talks on phone: Not on file     Gets together: Not on file     Attends Alevism service: Not on file     Active member of club or organization: Not on file     Attends meetings of clubs or organizations: Not on file     Relationship status: Not on file    Intimate partner violence:     Fear of current or ex partner: Not on file     Emotionally abused: Not on file     Physically abused: Not on file     Forced sexual activity: Not on file   Other Topics Concern    Not on file   Social History Narrative    Not on file       Allergies: No Known Allergies    Prior to Admission medications    Not on File       Active Problems:    Acute cholecystitis  Resolved Problems:    * No resolved hospital problems. *      Blood pressure 111/69, pulse 55, temperature 97.1 °F (36.2 °C), temperature source Temporal, resp. rate 18, height 5' 3\" (1.6 m), last menstrual period 06/14/2019, SpO2 100 %, unknown if currently breastfeeding. Review of Systems   Constitutional: Negative. Negative for activity change, fatigue and fever. HENT: Negative. Negative for congestion and dental problem. Eyes: Negative. Negative for pain and visual disturbance. Respiratory: Negative. Negative for chest tightness, shortness of breath and wheezing. Cardiovascular: Negative. Negative for chest pain. Gastrointestinal: Negative. Negative for abdominal pain, diarrhea and vomiting. Endocrine: Negative. Genitourinary: Negative. Negative for flank pain, frequency, hematuria and pelvic pain. Musculoskeletal: Negative. Negative for arthralgias, back pain, joint swelling, myalgias, neck pain and neck stiffness. Skin: Negative. Negative for color change and rash. Allergic/Immunologic: Negative. Negative for environmental allergies and immunocompromised state. Neurological: Negative. Negative for tremors, facial asymmetry, speech difficulty, weakness, light-headedness, numbness and headaches. Hematological: Negative. Negative for adenopathy. Psychiatric/Behavioral: Negative. Negative for agitation, behavioral problems and sleep disturbance. The patient is not nervous/anxious.         Physical Exam   Constitutional: She is oriented to person, place, and time. She appears well-developed and well-nourished. HENT:   Head: Normocephalic and atraumatic. Right Ear: External ear normal.   Left Ear: External ear normal.   Eyes: Conjunctivae and EOM are normal.   Neck: Normal range of motion. Neck supple. Cardiovascular: Normal rate and regular rhythm. Pulmonary/Chest: Effort normal and breath sounds normal.   Abdominal: Soft. She exhibits no distension. Tender RUQ   Musculoskeletal: Normal range of motion. She exhibits no edema. Neurological: She is alert and oriented to person, place, and time. Skin: Skin is warm and dry. Psychiatric: She has a normal mood and affect. Her behavior is normal.       Assessment:  68-year-old female who presented to the emergency room overnight with complaints of severe, dull epigastric and chest pain associated with nausea. There is mild right upper quadrant abdominal tenderness on physical examination. CAT scan of the abdomen and pelvis showed a dilated bowel gallbladder containing gallstones. The gallstones were confirmed on right upper quadrant ultrasound. The patient still has some residual pain. White blood count, lipase and liver function test are unremarkable. She either has had a prolonged episode of biliary colic or has a mild episode of acute cholecystitis. Plan:  Admit to the hospital.  Will plan for laparoscopic cholecystectomy. Patient explained the risks,benefits and complications including bleeding, bowel injury, cbd injury or hepatic artery injury.             Shaheed Tabor MD  6/21/2019

## 2019-06-21 NOTE — ED PROVIDER NOTES
[address]        Pt Name: Prabhakar Jones  MRN: [id number]  [place] 1975  Date of evaluation: 6/21/2019  Provider: Samara Caraballo  PCP: Referring Not In System (Inactive)    This patient was seen and evaluated by the attending physician Gemini Maza MD.      [address] [place] [address]       Chief Complaint   Patient presents with   [facility] Abdominal Pain      # [id number]. pt. was sleeping and pt. states she was unable to sleep. no stregth and feet are unable to respond. was having abd pain but not no more. states she has no strength hard to hold phone. feels hot. HISTORY OF PRESENT ILLNESS   (Location/Symptom, Timing/Onset, Context/Setting, Quality, Duration, Modifying Factors, Severity)  Note limiting factors. Prabhakar Jones is a 41 y.o. female patient presents the emergency department for evaluation of difficulty breathing, feeling hot and nauseated. Patient states she was sleeping and she woke up suddenly feeling like she could not breathe, that her limbs would not move the way she wanted them to, she felt hot and nauseated but did not vomit. Patient has never felt this before. Patient declines risk of possible pregnancy today. Patient denies headache, fever, chest pain, abdominal pain, and vomiting, diarrhea, pain with urination. Patient did not take any medications prior to or after this feeling. Patient states she felt well throughout the day and was able to eat and drink normally. Patient does not currently feel like it is difficult to swallow or breathe. Nursing Notes were all reviewed and agreed with or any disagreements were addressed  in the HPI. REVIEW OF SYSTEMS    (2-9 systems for level 4, 10 or more for level 5)     Review of Systems   Constitutional: Negative for fatigue and fever. HENT: Negative. Eyes: Negative for visual disturbance. Respiratory: Positive for shortness of breath.  Negative for cough and wheezing. Cardiovascular: Negative for chest pain and palpitations. Gastrointestinal: Negative for abdominal pain, diarrhea, nausea and vomiting. Genitourinary: Negative for dysuria. Musculoskeletal: Negative. Skin: Negative. Neurological: Positive for light-headedness. Negative for dizziness, syncope and headaches. Positives and Pertinent negatives as per HPI. Except as noted abovein the ROS, all other systems were reviewed and negative. PAST MEDICAL HISTORY     Past Medical History:   Diagnosis Date    AMA (advanced maternal age) multigravida 35+     Anemia     Pt taking PO Iron.  SGA (small for gestational age)     (prior pregnancy); EFW 2473g on 4/3/18.  18%. SURGICAL HISTORY     Past Surgical History:   Procedure Laterality Date    CHOLECYSTECTOMY, LAPAROSCOPIC N/A 6/21/2019    LAPAROSCOPIC CHOLECYSTECTOMY WITH INTRAOPERATIVE CHOLANGIOGRAM performed by Cliff Grossman MD at [address] [place] [address]       Discharge Medication List as of 6/22/2019 12:33 PM            ALLERGIES     Patient has no known allergies. FAMILYHISTORY     History reviewed. No pertinent family history. SOCIAL HISTORY       Social History     Socioeconomic History    Marital status:       Spouse name: Olga Middleton    Number of children: 11    Years of education: 6th    Highest education level: None   Occupational History    Occupation: unemployed   Social Needs    Financial resource strain: None    Food insecurity:     Worry: None     Inability: None    Transportation needs:     Medical: None     Non-medical: None   Tobacco Use    Smoking status: Never Smoker    Smokeless tobacco: Never Used   Substance and Sexual Activity    Alcohol use: No     Alcohol/week: 0.0 oz    Drug use: No    Sexual activity: Yes     Partners: Male   Lifestyle    Physical activity:     Days per week: None     Minutes per session: None    Stress: None   Relationships reviewed.       DIAGNOSTIC RESULTS   LABS:    Labs Reviewed   COMPREHENSIVE METABOLIC PANEL - Abnormal; Notable for the following components:       Result Value    Potassium 3.2 (*)     Glucose 126 (*)     AST 46 (*)     All other components within normal limits    Narrative:     Performed at:  [facility] [address] [place] [address] [employer]   Phone [phone]   URINE RT REFLEX TO CULTURE - Abnormal; Notable for the following components:    Leukocyte Esterase, Urine SMALL (*)     All other components within normal limits    Narrative:     Performed at:  [facility] [address], [place], [address] [employer]   Phone [phone]   D-DIMER, QUANTITATIVE - Abnormal; Notable for the following components:    D-Dimer, Quant 262 (*)     All other components within normal limits    Narrative:     Performed at:  [facility] [address], [place], [address] [employer]   Phone [phone]   MICROSCOPIC URINALYSIS - Abnormal; Notable for the following components:    Bacteria, UA RARE (*)     All other components within normal limits    Narrative:     Performed at:  [facility] [address], [place], [address] [employer]   Phone [phone]   CBC WITH AUTO DIFFERENTIAL - Abnormal; Notable for the following components:    RBC 3.92 (*)     Neutrophils # 8.6 (*)     Lymphocytes # 0.7 (*)     All other components within normal limits    Narrative:     Performed at:  [facility] [address], [place], [address] [employer]   Phone [phone]   URINE CULTURE    Narrative:     ORDER#: [id number]                          ORDERED BY: Roger Fulton  SOURCE: Urine Clean Catch                  COLLECTED:  06/21/19 04:48  ANTIBIOTICS AT TORI.:                      RECEIVED :  06/21/19 13:12  Performed at:  [facility]  [address] [facility] consider CT at 18-24 months. In a high-risk patient, CT at 6-12 months, then CT at 18-24 months. CT ABDOMEN PELVIS W IV CONTRAST   Final Result   Dilated gallbladder containing gallstones. Dilated common bile duct. Mildly   dilated fluid-filled distal ileum. Prominence at the ileocecal valve. RECOMMENDATIONS:   Right upper quadrant ultrasound. Consideration may be given to colonoscopy. No results found.        PROCEDURES   Unless otherwise noted below, none     Procedures    CRITICAL CARE TIME   N/A    CONSULTS:  IP CONSULT TO GENERAL SURGERY      EMERGENCY DEPARTMENT COURSE and DIFFERENTIALDIAGNOSIS/MDM:   Vitals:    Vitals:    06/22/19 0000 06/22/19 0550 06/22/19 0800 06/22/19 0852   BP: 98/60 96/61 103/69    Pulse: 58 59 53    Resp: 16 16 16 16   Temp: 97.4 °F (36.3 °C) 97.9 °F (36.6 °C) 98.9 °F (37.2 °C)    TempSrc: Oral Oral Oral    SpO2: 98% 97% 98% 98%   Weight:       Height:           Patient was given thefollowing medications:  Medications   0.9 % sodium chloride bolus (0 mLs Intravenous Stopped 6/21/19 0423)   potassium chloride (KLOR-CON M) extended release tablet 80 mEq (80 mEq Oral Given 6/21/19 0336)   0.9 % sodium chloride bolus (0 mLs Intravenous Stopped 6/21/19 0648)   ketorolac (TORADOL) injection 30 mg (30 mg Intravenous Given 6/21/19 0421)   HYDROcodone-acetaminophen (NORCO) 5-325 MG per tablet 1 tablet (1 tablet Oral Given 6/21/19 0421)   iopamidol (ISOVUE-370) 76 % injection 75 mL (75 mLs Intravenous Given 6/21/19 0452)   hyoscyamine (LEVSIN/SL) sublingual tablet 250 mcg (250 mcg Sublingual Given 6/21/19 0606)   magnesium citrate solution 150 mL (150 mLs Oral Given 6/21/19 0606)   sodium chloride 0.9 % irrigation (500 mLs Irrigation New Bag 6/21/19 1429)   lactated ringers infusion (1,000 mLs Irrigation New Bag 6/21/19 1430)   iothalamate (CONRAY) 60 % injection (25 mLs Intracatheter Given 6/21/19 1527)   bupivacaine-EPINEPHrine PF (MARCAINE-w/EPINEPHRINE) 0.5% -1:[id number] injection (30 mLs Intrapleural Given 6/21/19 1544)       Patient presents emergency department for evaluation of difficulty breathing. Patient states she felt well earlier today and went to bed without issue. Patient states she woke up and felt like she could not breathe. Patient also states that this made her feel hot and nauseated. Patient's lungs are clear to auscultation bilaterally heart rate is regular without murmurs rubs or gallops. Blood pressure is stable to her norm. Patient is afebrile. Patient's abdominal exam was nontender to palpation. No CVA tenderness. Patient's work-up is pending and if this is the end of my shift see Dr. Efraín Ngo note for further care of this patient. FINAL IMPRESSION      1. Colicky abdominal pain    2. Hypokalemia    3. Constipation, unspecified constipation type    4.  Gallstones          DISPOSITION/PLAN   DISPOSITION        PATIENT REFERREDTO:  Referring Not In [address] Yi Lopez MD  [address]  [address] [place] [address]  [phone]    Schedule an appointment as soon as possible for a visit in 2 weeks  For routine surgery follow-up      DISCHARGE MEDICATIONS:  Discharge Medication List as of 6/22/2019 12:33 PM          DISCONTINUED MEDICATIONS:  Discharge Medication List as of 6/22/2019 12:33 PM      STOP taking these medications       ibuprofen (ADVIL;MOTRIN) 800 MG tablet Comments:   Reason for Stopping:         ferrous sulfate 325 (65 Fe) MG tablet Comments:   Reason for Stopping:         Prenatal Vit-Fe Fumarate-FA (PRENATAL VITAMIN PLUS LOW IRON) 27-1 MG TABS Comments:   Reason for Stopping:                      (Please note that portions ofthis note were completed with a voice recognition program.  Efforts were made to edit the dictations but occasionally words are mis-transcribed.)    Jorje Jonas PA-C (electronically signed)            Jorje Jonas PA-C  06/24/19 8449

## 2019-06-21 NOTE — PROGRESS NOTES
Pt brought from ED to room 5582 in wheelchair. Pt is ambulatory and can transfer independently. Denies pain at this time. Language line at bedside. Using  # [id number]. Pt states pain is intermittent and will call out with needs. States pain is in mid abdomen when it occurs. Pt verbalizes understanding of NPO status prior to procedure this afternoon. Spoke with pre op. Will have  available to assist with surgical consent. Discussed plan of care in detail, allowed pt to ask questions, obtained all admission info, assessment completed and informed pt that LL is available at all times for her use. Pt verbalized understanding. Pt states she currently takes no medication at home.

## 2019-06-21 NOTE — ED PROVIDER NOTES
I independently examined and evaluated Coral Roldan. In brief, fairly dramatic 46-year-old female presents with what ends up being colicky abdominal pain referred to her chest and seems to be focused primarily in her right lower quadrant and midepigastrium that awoke her from sleep. She been well prior to going to bed. She is been nauseous with no vomiting. She denies melena or diarrhea. She had a normal bowel movement in the last 24 hours. No treatment tried prior to arrival    Focused exam revealed low blood pressures but consistent with patient's baseline on chart review. No tachycardia. Afebrile. Episodes of no pain and episodes of significant 10 out of 10 pain observed in the emergency department. Lungs clear to auscultation. Right upper quadrant tenderness and midepigastric tenderness but negative [place]'s. Negative heeltap. Normal bowel sounds. No CVA tenderness to palpation. ED course: Initial treatment with just fluids as patient was pain-free, patient then developed 10 out of 10 colicky pain with diaphoresis so morphine Toradol and Norco utilized. Low potassium treated with 80 mEq of p.o. potassium which patient tolerated    EKG is no comparison demonstrates physiologic bradycardia. Sinus bradycardia rate 53. Normal axis. . QRS 78. QTc 422. Normal R wave progression. No Q waves. No arrhythmia. No acute ST elevation or depression. Normal EKG. Troponin negative. CTA chest negative for any acute disease process based on d-dimer. No dissection, no PE, no pneumothorax, no infiltrate, no pleural effusion, no cardiomegaly. CT abdomen pelvis also with no evidence of aneurysm, kidney stone, abscess, perforation, obstruction, colitis, diverticulitis. Does demonstrate stones and dilated common bile duct. CT abdomen also demonstrates evidence of constipation. 150 mg p.o. mag citrate as well as 250 mcg of Levsin given.   Patient has been set up for a right upper quadrant ultrasound this morning and based on the results of that final disposition will be made. In total critical care time of 38 minutes with pulmonary, cardiac, GI, infectious disease, renal systems out acute risk of decompensation and collapse requiring frequent bedside repeat evaluations. This exclusion of any billable procedures. All diagnostic, treatment, and disposition decisions were made by myself in conjunction with the advanced practice provider. For all further details of the patient's emergency department visit, please see the advanced practice provider's documentation. Comment: Please note this report has been produced using speech recognition software and may contain errors related to that system including errors in grammar, punctuation, and spelling, as well as words and phrases that may be inappropriate. If there are any questions or concerns please feel free to contact the dictating provider for clarification.       Gemini Maza MD  06/21/19 6696

## 2019-06-22 ENCOUNTER — APPOINTMENT (OUTPATIENT)
Dept: GENERAL RADIOLOGY | Age: 44
End: 2019-06-22

## 2019-06-22 ENCOUNTER — HOSPITAL ENCOUNTER (EMERGENCY)
Age: 44
Discharge: HOME | End: 2019-06-23
Attending: EMERGENCY MEDICINE
Payer: MEDICAID

## 2019-06-22 VITALS
WEIGHT: 136 LBS | HEART RATE: 53 BPM | DIASTOLIC BLOOD PRESSURE: 69 MMHG | TEMPERATURE: 98.9 F | BODY MASS INDEX: 24.1 KG/M2 | SYSTOLIC BLOOD PRESSURE: 103 MMHG | HEIGHT: 63 IN | OXYGEN SATURATION: 98 % | RESPIRATION RATE: 16 BRPM

## 2019-06-22 DIAGNOSIS — R42 DIZZINESS: ICD-10-CM

## 2019-06-22 DIAGNOSIS — R55 SYNCOPE AND COLLAPSE: Primary | ICD-10-CM

## 2019-06-22 LAB
A/G RATIO: 1.2 (ref 1.1–2.2)
ALBUMIN SERPL-MCNC: 3.7 G/DL (ref 3.4–5)
ALP BLD-CCNC: 126 U/L (ref 40–129)
ALT SERPL-CCNC: 86 U/L (ref 10–40)
ANION GAP SERPL CALCULATED.3IONS-SCNC: 11 MMOL/L (ref 3–16)
AST SERPL-CCNC: 83 U/L (ref 15–37)
BASOPHILS ABSOLUTE: 0 K/UL (ref 0–0.2)
BASOPHILS ABSOLUTE: 0 K/UL (ref 0–0.2)
BASOPHILS RELATIVE PERCENT: 0.1 %
BASOPHILS RELATIVE PERCENT: 0.2 %
BILIRUB SERPL-MCNC: <0.2 MG/DL (ref 0–1)
BILIRUBIN URINE: NEGATIVE
BLOOD, URINE: NEGATIVE
BUN BLDV-MCNC: 8 MG/DL (ref 7–20)
CALCIUM SERPL-MCNC: 9.2 MG/DL (ref 8.3–10.6)
CHLORIDE BLD-SCNC: 106 MMOL/L (ref 99–110)
CLARITY: CLEAR
CO2: 23 MMOL/L (ref 21–32)
COLOR: YELLOW
CREAT SERPL-MCNC: 0.6 MG/DL (ref 0.6–1.1)
EOSINOPHILS ABSOLUTE: 0 K/UL (ref 0–0.6)
EOSINOPHILS ABSOLUTE: 0 K/UL (ref 0–0.6)
EOSINOPHILS RELATIVE PERCENT: 0 %
EOSINOPHILS RELATIVE PERCENT: 0.1 %
GFR AFRICAN AMERICAN: >60
GFR NON-AFRICAN AMERICAN: >60
GLOBULIN: 3 G/DL
GLUCOSE BLD-MCNC: 142 MG/DL (ref 70–99)
GLUCOSE URINE: NEGATIVE MG/DL
HCG QUALITATIVE: NEGATIVE
HCT VFR BLD CALC: 36.4 % (ref 36–48)
HCT VFR BLD CALC: 37 % (ref 36–48)
HEMOGLOBIN: 12 G/DL (ref 12–16)
HEMOGLOBIN: 12.1 G/DL (ref 12–16)
KETONES, URINE: NEGATIVE MG/DL
LEUKOCYTE ESTERASE, URINE: NEGATIVE
LIPASE: 37 U/L (ref 13–60)
LYMPHOCYTES ABSOLUTE: 0.7 K/UL (ref 1–5.1)
LYMPHOCYTES ABSOLUTE: 2.3 K/UL (ref 1–5.1)
LYMPHOCYTES RELATIVE PERCENT: 21.8 %
LYMPHOCYTES RELATIVE PERCENT: 7.6 %
MCH RBC QN AUTO: 30.5 PG (ref 26–34)
MCH RBC QN AUTO: 30.7 PG (ref 26–34)
MCHC RBC AUTO-ENTMCNC: 32.6 G/DL (ref 31–36)
MCHC RBC AUTO-ENTMCNC: 32.8 G/DL (ref 31–36)
MCV RBC AUTO: 92.9 FL (ref 80–100)
MCV RBC AUTO: 94.1 FL (ref 80–100)
MICROSCOPIC EXAMINATION: NORMAL
MONOCYTES ABSOLUTE: 0.3 K/UL (ref 0–1.3)
MONOCYTES ABSOLUTE: 0.5 K/UL (ref 0–1.3)
MONOCYTES RELATIVE PERCENT: 3.3 %
MONOCYTES RELATIVE PERCENT: 4.3 %
NEUTROPHILS ABSOLUTE: 7.7 K/UL (ref 1.7–7.7)
NEUTROPHILS ABSOLUTE: 8.6 K/UL (ref 1.7–7.7)
NEUTROPHILS RELATIVE PERCENT: 73.6 %
NEUTROPHILS RELATIVE PERCENT: 89 %
NITRITE, URINE: NEGATIVE
PDW BLD-RTO: 13 % (ref 12.4–15.4)
PDW BLD-RTO: 13 % (ref 12.4–15.4)
PH UA: 6.5 (ref 5–8)
PLATELET # BLD: 135 K/UL (ref 135–450)
PLATELET # BLD: 148 K/UL (ref 135–450)
PMV BLD AUTO: 10.2 FL (ref 5–10.5)
PMV BLD AUTO: 10.9 FL (ref 5–10.5)
POTASSIUM SERPL-SCNC: 3.3 MMOL/L (ref 3.5–5.1)
PROTEIN UA: NEGATIVE MG/DL
RBC # BLD: 3.92 M/UL (ref 4–5.2)
RBC # BLD: 3.93 M/UL (ref 4–5.2)
SODIUM BLD-SCNC: 140 MMOL/L (ref 136–145)
SPECIFIC GRAVITY UA: 1.01 (ref 1–1.03)
TOTAL PROTEIN: 6.7 G/DL (ref 6.4–8.2)
TROPONIN: <0.01 NG/ML
URINE CULTURE, ROUTINE: NORMAL
URINE REFLEX TO CULTURE: NORMAL
URINE TYPE: NORMAL
UROBILINOGEN, URINE: 0.2 E.U./DL
WBC # BLD: 10.4 K/UL (ref 4–11)
WBC # BLD: 9.7 K/UL (ref 4–11)

## 2019-06-22 PROCEDURE — 2580000003 HC RX 258: Performed by: ANESTHESIOLOGY

## 2019-06-22 PROCEDURE — 80053 COMPREHEN METABOLIC PANEL: CPT

## 2019-06-22 PROCEDURE — 93005 ELECTROCARDIOGRAM TRACING: CPT | Performed by: EMERGENCY MEDICINE

## 2019-06-22 PROCEDURE — 83690 ASSAY OF LIPASE: CPT

## 2019-06-22 PROCEDURE — 6360000002 HC RX W HCPCS: Performed by: PHYSICIAN ASSISTANT

## 2019-06-22 PROCEDURE — 85025 COMPLETE CBC W/AUTO DIFF WBC: CPT

## 2019-06-22 PROCEDURE — 6360000002 HC RX W HCPCS: Performed by: SURGERY

## 2019-06-22 PROCEDURE — 96361 HYDRATE IV INFUSION ADD-ON: CPT

## 2019-06-22 PROCEDURE — 71045 X-RAY EXAM CHEST 1 VIEW: CPT

## 2019-06-22 PROCEDURE — 2580000003 HC RX 258: Performed by: SURGERY

## 2019-06-22 PROCEDURE — 2580000003 HC RX 258: Performed by: PHYSICIAN ASSISTANT

## 2019-06-22 PROCEDURE — 96375 TX/PRO/DX INJ NEW DRUG ADDON: CPT

## 2019-06-22 PROCEDURE — 96374 THER/PROPH/DIAG INJ IV PUSH: CPT

## 2019-06-22 PROCEDURE — 94760 N-INVAS EAR/PLS OXIMETRY 1: CPT

## 2019-06-22 PROCEDURE — 84703 CHORIONIC GONADOTROPIN ASSAY: CPT

## 2019-06-22 PROCEDURE — 36415 COLL VENOUS BLD VENIPUNCTURE: CPT

## 2019-06-22 PROCEDURE — 84484 ASSAY OF TROPONIN QUANT: CPT

## 2019-06-22 PROCEDURE — 99024 POSTOP FOLLOW-UP VISIT: CPT | Performed by: SURGERY

## 2019-06-22 PROCEDURE — 81003 URINALYSIS AUTO W/O SCOPE: CPT

## 2019-06-22 PROCEDURE — 2500000003 HC RX 250 WO HCPCS: Performed by: SURGERY

## 2019-06-22 PROCEDURE — 99284 EMERGENCY DEPT VISIT MOD MDM: CPT

## 2019-06-22 RX ORDER — MORPHINE SULFATE 4 MG/ML
4 INJECTION, SOLUTION INTRAMUSCULAR; INTRAVENOUS ONCE
Status: COMPLETED | OUTPATIENT
Start: 2019-06-22 | End: 2019-06-22

## 2019-06-22 RX ORDER — 0.9 % SODIUM CHLORIDE 0.9 %
1000 INTRAVENOUS SOLUTION INTRAVENOUS ONCE
Status: COMPLETED | OUTPATIENT
Start: 2019-06-22 | End: 2019-06-23

## 2019-06-22 RX ORDER — ONDANSETRON 2 MG/ML
4 INJECTION INTRAMUSCULAR; INTRAVENOUS ONCE
Status: COMPLETED | OUTPATIENT
Start: 2019-06-22 | End: 2019-06-22

## 2019-06-22 RX ORDER — 0.9 % SODIUM CHLORIDE 0.9 %
1000 INTRAVENOUS SOLUTION INTRAVENOUS ONCE
Status: COMPLETED | OUTPATIENT
Start: 2019-06-22 | End: 2019-06-22

## 2019-06-22 RX ADMIN — ONDANSETRON 4 MG: 2 INJECTION INTRAMUSCULAR; INTRAVENOUS at 20:36

## 2019-06-22 RX ADMIN — SODIUM CHLORIDE 1000 ML: 9 INJECTION, SOLUTION INTRAVENOUS at 20:36

## 2019-06-22 RX ADMIN — Medication 10 ML: at 08:04

## 2019-06-22 RX ADMIN — MORPHINE SULFATE 4 MG: 4 INJECTION INTRAVENOUS at 22:21

## 2019-06-22 RX ADMIN — POTASSIUM CHLORIDE, DEXTROSE MONOHYDRATE AND SODIUM CHLORIDE: 150; 5; 450 INJECTION, SOLUTION INTRAVENOUS at 08:04

## 2019-06-22 RX ADMIN — SODIUM CHLORIDE 1000 ML: 9 INJECTION, SOLUTION INTRAVENOUS at 22:21

## 2019-06-22 RX ADMIN — CIPROFLOXACIN 400 MG: 2 INJECTION, SOLUTION INTRAVENOUS at 08:04

## 2019-06-22 RX ADMIN — SODIUM CHLORIDE: 9 INJECTION, SOLUTION INTRAVENOUS at 03:02

## 2019-06-22 ASSESSMENT — ENCOUNTER SYMPTOMS
ABDOMINAL PAIN: 1
CONSTIPATION: 0
VOMITING: 0
DIARRHEA: 0
COUGH: 0
BLOOD IN STOOL: 0
ANAL BLEEDING: 0
CHEST TIGHTNESS: 0
NAUSEA: 0
SHORTNESS OF BREATH: 0

## 2019-06-22 ASSESSMENT — PAIN SCALES - GENERAL
PAINLEVEL_OUTOF10: 0
PAINLEVEL_OUTOF10: 7

## 2019-06-22 NOTE — PROGRESS NOTES
Maryam Hernandez is a 41 y.o. female patient. Current Facility-Administered Medications   Medication Dose Route Frequency Provider Last Rate Last Dose    dextrose 5 % and 0.45 % NaCl with KCl 20 mEq infusion   Intravenous Continuous Geovanna Archibald  mL/hr at 06/22/19 0804      sodium chloride flush 0.9 % injection 10 mL  10 mL Intravenous 2 times per day Geovanna Archibald MD   10 mL at 06/22/19 0804    sodium chloride flush 0.9 % injection 10 mL  10 mL Intravenous PRN Geovanna Archibald MD        HYDROmorphone HCl PF (DILAUDID) injection 1 mg  1 mg Intravenous Q2H PRN Geovanna Archibald MD   1 mg at 06/21/19 1955    ondansetron (ZOFRAN) injection 4 mg  4 mg Intravenous Q6H PRN Geovanna Archibald MD        ciprofloxacin (CIPRO) IVPB 400 mg  400 mg Intravenous Q12H Geovanna Archibald  mL/hr at 06/22/19 0804 400 mg at 06/22/19 0804     No Known Allergies  Active Problems:    Acute cholecystitis    Cholecystitis without calculus  Resolved Problems:    * No resolved hospital problems. *    Blood pressure 103/69, pulse 53, temperature 98.9 °F (37.2 °C), temperature source Oral, resp. rate 16, height 5' 3\" (1.6 m), weight 136 lb (61.7 kg), last menstrual period 06/14/2019, SpO2 98 %, unknown if currently breastfeeding. Subjective:  Symptoms:  Stable. Activity level: Normal.    Pain:  She complains of pain that is mild. Objective:  General Appearance:  Comfortable. Vital signs: (most recent): Blood pressure 103/69, pulse 53, temperature 98.9 °F (37.2 °C), temperature source Oral, resp. rate 16, height 5' 3\" (1.6 m), weight 136 lb (61.7 kg), last menstrual period 06/14/2019, SpO2 98 %, unknown if currently breastfeeding. Output: Producing urine. Lungs:  Normal effort and normal respiratory rate. Abdomen: Abdomen is soft and non-distended. (Incisions healing well).       Assessment & Plan 72-year-old female who is postop day #1 status post laparoscopic cholecystectomy. Doing well. Will plan for discharge home.     Cliff Grossman MD  6/22/2019

## 2019-06-22 NOTE — PROGRESS NOTES
Patient has been discharged per MD orders. Patient verbalizes understanding of all instructions, medications, and follow up. Language line used to go over instructions. All questions answered. IV has been removed, catheter intact, patient tolerated well. All belongings have been gathered and packed.  Family in route to transport patient from hospital. Pt transported to [facility] in  Wheelchair by this RN

## 2019-06-22 NOTE — PROGRESS NOTES
Shift assessment complete. VSS. Scheduled medications given. Pt denies pain. LL used. Supplied with menu, breakfast ordered.

## 2019-06-22 NOTE — PROGRESS NOTES
Pt. Ax performed; VSS - see flowsheets. Pt. Rx'd meds given - see MAR. Pt. Denies other needs at this time. Pt. Shows no signs of distress or discomfort. Call light/table in reach. Fall Precautions in place. Will continue to monitor.

## 2019-06-22 NOTE — PROGRESS NOTES
Pt. Resting quietly; eyes closed. Respirations even, unlabored, and easy. Fall precautions in place. Call light/table in reach. Will continue to monitor.

## 2019-06-22 NOTE — OP NOTE
[facility]                     [facility], [address] [place] [address]                                OPERATIVE REPORT    PATIENT NAME: Neida Gil             :        1975  MED REC NO:   [id number]                          ROOM:       97  ACCOUNT NO:   [de-identified]                           ADMIT DATE: 2019  PROVIDER:     Cristine Faria MD    DATE OF PROCEDURE:  2019    PREOPERATIVE DIAGNOSIS:  Cholecystitis with cholelithiasis. POSTOPERATIVE DIAGNOSIS:  Cholecystitis with cholelithiasis. PROCEDURE:  Laparoscopic cholecystectomy with intraoperative  cholangiogram.    SURGEON:  Cristine Faria MD    ANESTHESIA:  General endotracheal.    ESTIMATED BLOOD LOSS:  Minimal.    COMPLICATIONS:  None. SPECIMEN:  Gallbladder. OPERATIVE INDICATIONS:  The patient is a 37-year-old female who presents  to the emergency room with complaints of epigastric and chest pain. Both CAT scan and right upper quadrant ultrasound showed gallstones. The patient's liver function tests, lipase, and white blood count were  normal.  The patient's pain was not able to be controlled. She is  therefore admitted for pain control and laparoscopic cholecystectomy. She was explained the risks, benefits and possible complications of the  procedure including risk of bleeding, bowel injury, bile duct injury or  vascular injury to liver. DETAILS OF THE PROCEDURE:  The patient was brought in the operative  suite, placed in a supine position on the operative table. After  general endotracheal anesthesia, she was prepped and draped in usual  sterile fashion. We made a 5-mm transverse incision approximately 3 to  4 cm above the umbilicus. A Veress needle was passed the peritoneal  cavity and after adequate insufflation, a 5 mm OptiView trocar placed at  this site.   An 11-mm trocar placed in the subxiphoid location followed  by two 5-mm trocars right upper

## 2019-06-22 NOTE — PROGRESS NOTES
Pt. Routine VSS - see flowsheets. Pt. Denies other needs at this time; pt shows no signs of discomfort or distress. Call light/table in reach. Fall precautions in place. Will continue to monitor.

## 2019-06-23 ENCOUNTER — APPOINTMENT (OUTPATIENT)
Dept: CT IMAGING | Age: 44
End: 2019-06-23

## 2019-06-23 ENCOUNTER — HOSPITAL ENCOUNTER (EMERGENCY)
Age: 44
Discharge: HOME | End: 2019-06-24
Attending: EMERGENCY MEDICINE

## 2019-06-23 ENCOUNTER — APPOINTMENT (OUTPATIENT)
Dept: NUCLEAR MEDICINE | Age: 44
End: 2019-06-23

## 2019-06-23 VITALS
HEART RATE: 60 BPM | DIASTOLIC BLOOD PRESSURE: 56 MMHG | WEIGHT: 136 LBS | RESPIRATION RATE: 16 BRPM | HEIGHT: 63 IN | TEMPERATURE: 98.4 F | OXYGEN SATURATION: 98 % | BODY MASS INDEX: 24.1 KG/M2 | SYSTOLIC BLOOD PRESSURE: 97 MMHG

## 2019-06-23 DIAGNOSIS — R10.9 POSTOPERATIVE ABDOMINAL PAIN: ICD-10-CM

## 2019-06-23 DIAGNOSIS — G89.18 POSTOPERATIVE ABDOMINAL PAIN: ICD-10-CM

## 2019-06-23 DIAGNOSIS — R74.01 TRANSAMINITIS: ICD-10-CM

## 2019-06-23 DIAGNOSIS — R10.13 ABDOMINAL PAIN, EPIGASTRIC: Primary | ICD-10-CM

## 2019-06-23 LAB
A/G RATIO: 1.3 (ref 1.1–2.2)
ALBUMIN SERPL-MCNC: 4 G/DL (ref 3.4–5)
ALP BLD-CCNC: 155 U/L (ref 40–129)
ALT SERPL-CCNC: 121 U/L (ref 10–40)
ANION GAP SERPL CALCULATED.3IONS-SCNC: 11 MMOL/L (ref 3–16)
AST SERPL-CCNC: 141 U/L (ref 15–37)
BASOPHILS ABSOLUTE: 0 K/UL (ref 0–0.2)
BASOPHILS RELATIVE PERCENT: 0.3 %
BILIRUB SERPL-MCNC: 0.4 MG/DL (ref 0–1)
BUN BLDV-MCNC: 8 MG/DL (ref 7–20)
CALCIUM SERPL-MCNC: 9.3 MG/DL (ref 8.3–10.6)
CHLORIDE BLD-SCNC: 101 MMOL/L (ref 99–110)
CO2: 27 MMOL/L (ref 21–32)
CREAT SERPL-MCNC: 0.7 MG/DL (ref 0.6–1.1)
EKG ATRIAL RATE: 58 BPM
EKG DIAGNOSIS: NORMAL
EKG P AXIS: 16 DEGREES
EKG P-R INTERVAL: 138 MS
EKG Q-T INTERVAL: 416 MS
EKG QRS DURATION: 80 MS
EKG QTC CALCULATION (BAZETT): 408 MS
EKG R AXIS: 37 DEGREES
EKG T AXIS: 55 DEGREES
EKG VENTRICULAR RATE: 58 BPM
EOSINOPHILS ABSOLUTE: 0 K/UL (ref 0–0.6)
EOSINOPHILS RELATIVE PERCENT: 0.3 %
GFR AFRICAN AMERICAN: >60
GFR NON-AFRICAN AMERICAN: >60
GLOBULIN: 3.2 G/DL
GLUCOSE BLD-MCNC: 98 MG/DL (ref 70–99)
HCG QUALITATIVE: NEGATIVE
HCT VFR BLD CALC: 38.2 % (ref 36–48)
HEMOGLOBIN: 12.5 G/DL (ref 12–16)
LIPASE: 44 U/L (ref 13–60)
LYMPHOCYTES ABSOLUTE: 1.7 K/UL (ref 1–5.1)
LYMPHOCYTES RELATIVE PERCENT: 23.3 %
MCH RBC QN AUTO: 31 PG (ref 26–34)
MCHC RBC AUTO-ENTMCNC: 32.7 G/DL (ref 31–36)
MCV RBC AUTO: 95.1 FL (ref 80–100)
MONOCYTES ABSOLUTE: 0.4 K/UL (ref 0–1.3)
MONOCYTES RELATIVE PERCENT: 5.9 %
NEUTROPHILS ABSOLUTE: 5 K/UL (ref 1.7–7.7)
NEUTROPHILS RELATIVE PERCENT: 70.2 %
PDW BLD-RTO: 13.1 % (ref 12.4–15.4)
PLATELET # BLD: 137 K/UL (ref 135–450)
PMV BLD AUTO: 11 FL (ref 5–10.5)
POTASSIUM REFLEX MAGNESIUM: 3.7 MMOL/L (ref 3.5–5.1)
RBC # BLD: 4.02 M/UL (ref 4–5.2)
SODIUM BLD-SCNC: 139 MMOL/L (ref 136–145)
TOTAL PROTEIN: 7.2 G/DL (ref 6.4–8.2)
WBC # BLD: 7.2 K/UL (ref 4–11)

## 2019-06-23 PROCEDURE — 93010 ELECTROCARDIOGRAM REPORT: CPT | Performed by: INTERNAL MEDICINE

## 2019-06-23 PROCEDURE — 96374 THER/PROPH/DIAG INJ IV PUSH: CPT

## 2019-06-23 PROCEDURE — 74177 CT ABD & PELVIS W/CONTRAST: CPT

## 2019-06-23 PROCEDURE — 84703 CHORIONIC GONADOTROPIN ASSAY: CPT

## 2019-06-23 PROCEDURE — 85025 COMPLETE CBC W/AUTO DIFF WBC: CPT

## 2019-06-23 PROCEDURE — 80053 COMPREHEN METABOLIC PANEL: CPT

## 2019-06-23 PROCEDURE — 78226 HEPATOBILIARY SYSTEM IMAGING: CPT

## 2019-06-23 PROCEDURE — A9537 TC99M MEBROFENIN: HCPCS | Performed by: EMERGENCY MEDICINE

## 2019-06-23 PROCEDURE — 6360000004 HC RX CONTRAST MEDICATION: Performed by: PHYSICIAN ASSISTANT

## 2019-06-23 PROCEDURE — 83690 ASSAY OF LIPASE: CPT

## 2019-06-23 PROCEDURE — 99283 EMERGENCY DEPT VISIT LOW MDM: CPT

## 2019-06-23 PROCEDURE — 6360000002 HC RX W HCPCS: Performed by: PHYSICIAN ASSISTANT

## 2019-06-23 PROCEDURE — 3430000000 HC RX DIAGNOSTIC RADIOPHARMACEUTICAL: Performed by: EMERGENCY MEDICINE

## 2019-06-23 RX ORDER — ACETAMINOPHEN 500 MG
1000 TABLET ORAL 3 TIMES DAILY PRN
Qty: 180 TABLET | Refills: 0 | Status: SHIPPED | OUTPATIENT
Start: 2019-06-23 | End: 2020-08-17 | Stop reason: HOSPADM

## 2019-06-23 RX ORDER — MORPHINE SULFATE 4 MG/ML
4 INJECTION, SOLUTION INTRAMUSCULAR; INTRAVENOUS ONCE
Status: COMPLETED | OUTPATIENT
Start: 2019-06-23 | End: 2019-06-23

## 2019-06-23 RX ORDER — MORPHINE SULFATE 4 MG/ML
INJECTION, SOLUTION INTRAMUSCULAR; INTRAVENOUS
Status: DISCONTINUED
Start: 2019-06-23 | End: 2019-06-24 | Stop reason: HOSPADM

## 2019-06-23 RX ADMIN — MORPHINE SULFATE 4 MG: 4 INJECTION INTRAVENOUS at 18:38

## 2019-06-23 RX ADMIN — Medication 5.5 MILLICURIE: at 22:53

## 2019-06-23 RX ADMIN — IOPAMIDOL 75 ML: 755 INJECTION, SOLUTION INTRAVENOUS at 18:40

## 2019-06-23 ASSESSMENT — ENCOUNTER SYMPTOMS
DIARRHEA: 0
NAUSEA: 0
ABDOMINAL PAIN: 1
CONSTIPATION: 0
VOMITING: 0
SHORTNESS OF BREATH: 0

## 2019-06-23 ASSESSMENT — PAIN SCALES - GENERAL
PAINLEVEL_OUTOF10: 8
PAINLEVEL_OUTOF10: 8
PAINLEVEL_OUTOF10: 0

## 2019-06-23 NOTE — ED NOTES
Bed: 15  Expected date:   Expected time:   Means of arrival:   Comments:  soria after clean     Shruthi Rater, CARLENE  06/23/19 8852

## 2019-06-23 NOTE — ED NOTES
Pt discharged in stable condition, VSS, no signs of distress, discharge instructions and meds reviewed. Pt verbalizes understanding or concerns unaddressed.         Nader Mazariegos RN  06/23/19 7264

## 2019-06-23 NOTE — ED PROVIDER NOTES
Constitutional: Negative for chills and fever. Respiratory: Negative for shortness of breath. Cardiovascular: Negative for chest pain. Gastrointestinal: Positive for abdominal pain. Negative for constipation, diarrhea, nausea and vomiting. All other systems reviewed and are negative. Positives and pertinent negatives as per HPI. All other systems were reviewed and are negative. PHYSICAL EXAM    (up to 7 for level 4, 8 or more for level 5)     ED Triage Vitals [06/23/19 1707]   BP Temp Temp Source Pulse Resp SpO2 Height Weight   (!) 104/52 97 °F (36.1 °C) Oral 64 18 100 % 5' 3\" (1.6 m) 140 lb (63.5 kg)       Physical Exam   Constitutional: She is oriented to person, place, and time. She appears well-developed and well-nourished. She is active and cooperative. Non-toxic appearance. HENT:   Head: Normocephalic. Right Ear: External ear normal.   Left Ear: External ear normal.   Nose: Nose normal.   Eyes: Conjunctivae are normal. Right eye exhibits no discharge. Left eye exhibits no discharge. Neck: Normal range of motion. Neck supple. Cardiovascular: Normal rate, regular rhythm and normal heart sounds. Exam reveals no gallop and no friction rub. No murmur heard. Pulmonary/Chest: Effort normal and breath sounds normal. No stridor. No respiratory distress. She has no wheezes. She has no rales. Abdominal: Soft. Bowel sounds are normal. She exhibits no distension and no mass. There is tenderness in the epigastric area. There is no guarding. Surgical wounds healing well without any erythema, induration or fluctuance. Musculoskeletal: Normal range of motion. Neurological: She is alert and oriented to person, place, and time. Skin: Skin is warm and dry. She is not diaphoretic. No pallor. Psychiatric: She has a normal mood and affect. Her behavior is normal.   Nursing note and vitals reviewed.       PAST MEDICAL HISTORY     Past Medical History:   Diagnosis Date    AMA (advanced maternal age) multigravida 35+     Anemia     Pt taking PO Iron.  SGA (small for gestational age)     (prior pregnancy); EFW 2473g on 4/3/18.  18%. SURGICAL HISTORY       Past Surgical History:   Procedure Laterality Date    CHOLECYSTECTOMY, LAPAROSCOPIC N/A 6/21/2019    LAPAROSCOPIC CHOLECYSTECTOMY WITH INTRAOPERATIVE CHOLANGIOGRAM performed by Caroline Madera MD at [address] [place] [address]       Previous Medications    ACETAMINOPHEN (TYLENOL) 500 MG TABLET    Take 2 tablets by mouth 3 times daily as needed for Pain    OXYCODONE-ACETAMINOPHEN (PERCOCET) 5-325 MG PER TABLET    Take 1-2 tablets by mouth every 4 hours as needed for Pain for up to 3 days. Intended supply: 3 days. Take lowest dose possible to manage pain       ALLERGIES     Patient has no known allergies. FAMILY HISTORY     History reviewed. No pertinent family history. SOCIAL HISTORY       Social History     Socioeconomic History    Marital status:       Spouse name: Krystle Calixto    Number of children: 11    Years of education: 6th    Highest education level: None   Occupational History    Occupation: unemployed   Social Needs    Financial resource strain: None    Food insecurity:     Worry: None     Inability: None    Transportation needs:     Medical: None     Non-medical: None   Tobacco Use    Smoking status: Never Smoker    Smokeless tobacco: Never Used   Substance and Sexual Activity    Alcohol use: No     Alcohol/week: 0.0 oz    Drug use: No    Sexual activity: Yes     Partners: Male   Lifestyle    Physical activity:     Days per week: None     Minutes per session: None    Stress: None   Relationships    Social connections:     Talks on phone: None     Gets together: None     Attends U[employer] service: None     Active member of club or organization: None     Attends meetings of clubs or organizations: None     Relationship status: None    Intimate partner violence:     Fear of current or ex partner: None     Emotionally abused: None     Physically abused: None     Forced sexual activity: None   Other Topics Concern    None   Social History Narrative    None       SCREENINGS           DIAGNOSTIC RESULTS   LABS:    Labs Reviewed   CBC WITH AUTO DIFFERENTIAL - Abnormal; Notable for the following components:       Result Value    MPV 11.0 (*)     All other components within normal limits    Narrative:     Performed at:  [facility]  [address] [facility], [place]   Phone [phone]   COMPREHENSIVE METABOLIC PANEL W/ REFLEX TO MG FOR LOW K - Abnormal; Notable for the following components:    Alkaline Phosphatase 155 (*)      (*)      (*)     All other components within normal limits    Narrative:     Performed at:  [facility]  [address] [facility], [place]   Phone [phone]   LIPASE    Narrative:     Performed at:  [facility] [address] [facility], [place]   Phone [phone]   HCG, SERUM, QUALITATIVE    Narrative:     Performed at:  [facility] [address], [place]   Phone [phone]       All other labs were within normal range or not returned as of this dictation. EKG: All EKG's areinterpreted by the Emergency Department Physician who either signs or Co-signs this chart in the absence of a cardiologist.    RADIOLOGY:   Non-plain film images such as CT, Ultrasound and MRI are read by the radiologist. Jamel Bobo radiographicimages are visualized and preliminarily interpreted by the  ED Provider with the below findings:    Interpretation per the Radiologist below, if available at the time of this note:    NM HEPATOBILIARY   Final Result   No scintigraphic evidence for bile leak. CT ABDOMEN PELVIS W IV CONTRAST Additional Contrast? None   Final Result   1. Status post interval cholecystectomy.   Mild stranding in the gallbladder   fossa likely postoperative in nature. No drainable fluid collection. Trace   likely reactive free fluid in the left pelvis. Small volume of   pneumoperitoneum likely within normal postoperative limits. 2. Gas and possible fluid within the endometrial canal.  Endometritis not   excluded. 3. Trace right pleural effusion and mild bibasilar atelectasis. PROCEDURES   Unless otherwisenoted below, none     Procedures    CRITICAL CARE TIME   N/A    CONSULTS:  None    EMERGENCY DEPARTMENT COURSE andDIFFERENTIAL DIAGNOSIS/MDM:   Vitals:    Vitals:    06/23/19 1707   BP: (!) 104/52   Pulse: 64   Resp: 18   Temp: 97 °F (36.1 °C)   TempSrc: Oral   SpO2: 100%   Weight: 140 lb (63.5 kg)   Height: 5' 3\" (1.6 m)       Patient wasgiven the following medications:  Medications   morphine 4 MG/ML injection (has no administration in time range)   morphine injection 4 mg (4 mg Intravenous Given 6/23/19 1838)   iopamidol (ISOVUE-370) 76 % injection 75 mL (75 mLs Intravenous Given 6/23/19 1840)     Kaelyn Aguayo is a 41 y.o. female who presents to the emergency department with complaints of epigastric abdominal pain. Patient speaks Syriac but has family translate. Patient had recently had her gallbladder removed 2 days ago and discharged home. Patient was started on Percocet for pain. Apparently the Percocet made her very drowsy and yesterday she had a syncopal episode and presented to the emergency department. Patient and family stated at that time that the Percocet was too strong for her but was also at the time having abdominal pain. Was given a prescription for Tylenol which seems to have been working but states that today she does have increased pain again. Rates her pain currently 8 out of 10 in severity. Surgical wounds are healing well without erythema, warmth or drainage. Again locates the pain to the epigastric region with radiation into her back.   Denies any focal numbness tingling or weakness. No nausea, vomiting, diarrhea, constipation or urinary symptoms. Denies chest pain or shortness of breath. On exam patient has pain to the epigastric region only. Current vitals are stable. Apparently evaluated in the emergency department last night and given a dose of morphine, and discharged home with Tylenol. Did not have the Tylenol filled. Patient continues to have this discomfort. I did have attending physician see this patient as CT imaging results are still currently pending. Please see attending physician note for further disposition. The patient tolerated their visit well. They were seen and evaluated by the attending physician, who agreed with the assessment and plan. FINAL IMPRESSION      1. Abdominal pain, epigastric    2. Postoperative abdominal pain    3. Transaminitis        DISPOSITION/PLAN   DISPOSITION        PATIENT REFERRED TO:  No follow-up provider specified.     DISCHARGE MEDICATIONS:  New Prescriptions    No medications on file       DISCONTINUED MEDICATIONS:  Discontinued Medications    No medications on file            (Please note that portions of this note were completed with a voice recognition program.  Efforts were made to edit the dictations but occasionally words aremis-transcribed.)    GERMAN Steve (electronically signed)            GERMAN Pérez  06/24/19 7539

## 2019-06-23 NOTE — ED PROVIDER NOTES
and fever. Respiratory: Negative for cough, chest tightness and shortness of breath. Cardiovascular: Negative for chest pain, palpitations and leg swelling. Gastrointestinal: Positive for abdominal pain. Negative for anal bleeding, blood in stool, constipation, diarrhea, nausea and vomiting. Genitourinary: Negative for difficulty urinating, dysuria, flank pain and frequency. Neurological: Positive for dizziness, syncope and light-headedness. Negative for seizures, facial asymmetry, speech difficulty, weakness, numbness and headaches. All other systems reviewed and are negative. Positives and Pertinent negatives as per HPI. Except as noted abovein the ROS, all other systems were reviewed and negative. PAST MEDICAL HISTORY     Past Medical History:   Diagnosis Date    AMA (advanced maternal age) multigravida 35+     Anemia     Pt taking PO Iron.  SGA (small for gestational age)     (prior pregnancy); EFW 2473g on 4/3/18.  18%. SURGICAL HISTORY     Past Surgical History:   Procedure Laterality Date    CHOLECYSTECTOMY, LAPAROSCOPIC N/A 6/21/2019    LAPAROSCOPIC CHOLECYSTECTOMY WITH INTRAOPERATIVE CHOLANGIOGRAM performed by Sana Martinez MD at [address] [place] [address]       Previous Medications    OXYCODONE-ACETAMINOPHEN (PERCOCET) 5-325 MG PER TABLET    Take 1-2 tablets by mouth every 4 hours as needed for Pain for up to 3 days. Intended supply: 3 days. Take lowest dose possible to manage pain         ALLERGIES     Patient has no known allergies. FAMILYHISTORY     History reviewed. No pertinent family history. SOCIAL HISTORY       Social History     Socioeconomic History    Marital status:       Spouse name: Micheal Rush    Number of children: 11    Years of education: 6th    Highest education level: None   Occupational History    Occupation: unemployed   Social Needs    Financial resource strain: None    Food insecurity:     Worry: None abdomen with Dermabond. There is no erythema, induration, warmth or drainage. She has expected postsurgical swelling and tenderness on palpation. There is no abdominal distention or rigidity. She has bowel sounds present in all 4 quadrants. Musculoskeletal: Normal range of motion. Neurological: She is alert and oriented to person, place, and time. Patient is awake, alert & oriented x3 and speaking in complete sentences without dysphasia or slurring of their words. 5/5 motor strength in extremities bilaterally. Patient denies sensory deficits and extremities are neurovascularly intact. Good coordination with normal finger to nose test, heel-to-shin. Gait is normal without ataxia. Romberg is normal.  No truncal ataxia  Cranial nerves II-XII grossly intact      Skin: Skin is warm and dry. She is not diaphoretic. No pallor. Psychiatric: She has a normal mood and affect. Her behavior is normal.   Nursing note and vitals reviewed.       DIAGNOSTIC RESULTS   LABS:    Labs Reviewed   CBC WITH AUTO DIFFERENTIAL - Abnormal; Notable for the following components:       Result Value    RBC 3.93 (*)     MPV 10.9 (*)     All other components within normal limits    Narrative:     Performed at:  [facility] [address], [place], [address] [facility]   Phone [phone]   COMPREHENSIVE METABOLIC PANEL - Abnormal; Notable for the following components:    Potassium 3.3 (*)     Glucose 142 (*)     ALT 86 (*)     AST 83 (*)     All other components within normal limits    Narrative:     Performed at:  [facility] [address], [place], [address]   Phone [phone]   LIPASE    Narrative:     Performed at:  [facility] [address], [place], [address]   Phone [phone]   HCG, SERUM, QUALITATIVE    Narrative:     Performed at:  [facility] [address], [place], [address] Phone [phone]   URINE RT REFLEX TO CULTURE    Narrative:     Performed at:  [facility]  [address] [facility], [address]   Phone [phone]   TROPONIN    Narrative:     Performed at:  [facility]  [address] [facility], [address]   Phone [phone]       All other labs were within normal range or not returned as of this dictation. EKG: All EKG's are interpreted by the Emergency Department Physician who either signs orCo-signs this chart in the absence of a cardiologist.  Please see their note for interpretation of EKG. RADIOLOGY:   Non-plain film images such as CT, Ultrasound and MRI are read by the radiologist. Plain radiographic images are visualized andpreliminarily interpreted by the  ED Provider with the below findings:        Interpretation perthe Radiologist below, if available at the time of this note:    XR CHEST PORTABLE   Final Result   No focal consolidation or overt heart failure appreciated. Trace pneumoperitoneum beneath right hemidiaphragm. This is likely   attributable to abdominal surgery performed the prior day for cholecystectomy. These findings were discussed with Dr. Sunny Pedersen in the ER at 9:19 p.m.,   06/22/2019. He confirmed that abdominal surgery was performed on the   previous day. Ct Abdomen Pelvis W Iv Contrast    Result Date: 6/21/2019  EXAMINATION: CT OF THE ABDOMEN AND PELVIS WITH CONTRAST 6/21/2019 4:34 am TECHNIQUE: CT of the abdomen and pelvis was performed with the administration of intravenous contrast. Multiplanar reformatted images are provided for review. Dose modulation, iterative reconstruction, and/or weight based adjustment of the mA/kV was utilized to reduce the radiation dose to as low as reasonably achievable. COMPARISON: None. HISTORY: ORDERING SYSTEM PROVIDED HISTORY: abd pain TECHNOLOGIST PROVIDED HISTORY: IV contrast only. Thank you.  Ordering Physician Provided Reason for Exam: abdominal pain Acuity: Unknown Type of Exam: Unknown Relevant Medical/Surgical History: ( # [id number]. pt. was sleeping and pt. states she was unable to sleep. no strength and feet are unable to respond. was having abd pain but not no more. states she has no strength hard to hold phone. feels hot. ) FINDINGS: Lower Chest: No acute abnormality in the included chest base. Organs: The spleen, pancreas, adrenals, and kidneys are unremarkable. Gallbladder contains gallstones. Mild dilatation of the common bile duct is present without dense gallstones in the bile duct. GI/Bowel: Large amount of stool in the colon with constipated appearance. No free fluid, or free air is noted. Terminal ileum is minimally prominent and fluid-filled. Ileocecal valve appears prominent. No bowel obstruction is evident. Pelvis: No appendicitis. Bladder is unremarkable. Endometrium measures 15 mm, mildly prominent. No free pelvic fluid, pelvic or inguinal adenopathy is noted. Peritoneum/Retroperitoneum: No aortic aneurysm, retroperitoneal or mesenteric adenopathy is noted. Bones/Soft Tissues: No acute osseous or soft tissue abnormality. Estimated biologic radiation dose for this procedure:281.61 mGy/cm2. Dilated gallbladder containing gallstones. Dilated common bile duct. Mildly dilated fluid-filled distal ileum. Prominence at the ileocecal valve. RECOMMENDATIONS: Right upper quadrant ultrasound. Consideration may be given to colonoscopy. Ct Chest Pulmonary Embolism W Contrast    Result Date: 6/21/2019  EXAMINATION: CTA OF THE CHEST 6/21/2019 4:58 am TECHNIQUE: CTA of the chest was performed after the administration of intravenous contrast.  Multiplanar reformatted images are provided for review. MIP images are provided for review.  Dose modulation, iterative reconstruction, and/or weight based adjustment of the mA/kV was utilized to reduce the radiation dose to as low as reasonably achievable. COMPARISON: None. HISTORY: ORDERING SYSTEM PROVIDED HISTORY: SHORTNESS OF BREATH TECHNOLOGIST PROVIDED HISTORY: Ordering Physician Provided Reason for Exam: shortness of breath Acuity: Unknown Relevant Medical/Surgical History: ( # [id number]. pt. was sleeping and pt. states she was unable to sleep. no stregth and feet are unable to respond. was having abd pain but not no more. states she has no strength hard to hold phone. feels hot. ) FINDINGS: Pulmonary Arteries: There is no acute pulmonary thromboembolus. Mediastinum: The heart is unremarkable. There are no enlarged thoracic lymph nodes. Lungs/pleura: The airway is patent. There is no pneumothorax or pleural effusion. There is biapical scarring. Within the right upper lobe, there is a 6 mm linear nodule which may be related to scarring. Upper Abdomen: Cholelithiases are present in the lumen of the gallbladder with mild intrahepatic ductal dilatation. Soft Tissues/Bones: The osseous structures are unremarkable. 1. 6 mm solid right upper lobe linear nodule may be related to scarring. RECOMMENDATION: Fleischner Society guidelines for follow-up and management of incidentally detected pulmonary nodules: Single Solid Nodule: Nodule size equals 6-8 mm In a low-risk patient, CT at 6-12 months, then consider CT at 18-24 months. In a high-risk patient, CT at 6-12 months, then CT at 18-24 months. Us Abdomen Limited    Result Date: 6/21/2019  EXAMINATION: RIGHT UPPER QUADRANT ULTRASOUND 6/21/2019 6:39 am COMPARISON: None. HISTORY: ORDERING SYSTEM PROVIDED HISTORY: RUQ pain TECHNOLOGIST PROVIDED HISTORY: Reason for exam:->RUQ pain Ordering Physician Provided Reason for Exam: ruq pain Acuity: Acute Type of Exam: Unknown FINDINGS: LIVER:  The liver demonstrates normal echogenicity without evidence of intrahepatic biliary ductal dilatation. BILIARY SYSTEM:  Cholelithiasis. Calculus is seen at the gallbladder neck.  No sonographic Phyllistine Grills arrival.  This happened after taking Percocet for the first time. She is 1 day status post cholecystectomy. She is alert and oriented x3 with GCS 15 on arrival.  She has a normal neurologic exam.  Her only complaint is right upper abdominal pain. EKG shows no signs of acute ischemia or infarction. Troponin <0.01. Chest x-ray shows no acute cardiopulmonary disease. She does have trace pneumoperitoneum beneath the right hemidiaphragm which is postoperative. She has a normal amount of RUQ abdominal tenderness on palpation. There is no distention, rigidity or guarding. Surgical incisions appear to be healing well without signs of infection. CBC, BMP, LFTs, lipase and urine analysis are unremarkable. Quantitative hCG is negative. Patient is given a fluid bolus. She remains mildly hypotensive which in review of her records appears to be chronic. She is given 1 additional fluid bolus as well as a small dose of pain and nausea medication. On reexamination she is feeling better. Blood pressure is 121 systolic. She is not tachycardic, tachypneic or hypoxemic. She is nonfebrile and does not appear septic or ill. At this time there is no indication of head injury, encephalopathy, multiple sclerosis, TIA/CVA, seizures, dehydration, hypoglycemia, mass lesions, metabolic cause, cardiac arrhythmia, PE, GI bleeding or orthostatic hypotension. Patient is stable throughout ED course and safe for outpatient follow-up. Patient made aware of treatment plan and verbally understood and agreed. Patient stable for outpatient follow-up with their family doctor in 24-48 hours. FINAL IMPRESSION      1. Syncope and collapse    2.  Dizziness          DISPOSITION/PLAN   DISPOSITION        PATIENT REFERREDTO:  [facility]) Pre-Services  [phone]  Schedule an appointment as soon as possible for a visit         DISCHARGE MEDICATIONS:  New Prescriptions    ACETAMINOPHEN (TYLENOL) 500 MG TABLET    Take 2 tablets by mouth 3 times daily as needed for Pain       DISCONTINUED MEDICATIONS:  Discontinued Medications    No medications on file              (Please note that portions ofthis note were completed with a voice recognition program.  Efforts were made to edit the dictations but occasionally words are mis-transcribed.)    Fide Sawant PA-C (electronically signed)           Fide Sawant PA-C  06/23/19 0014

## 2019-06-23 NOTE — ED NOTES
IV started without complications, pt tolerated well. Blood work collected and sent to lab. 20g in right [facility] [place]. Pt medicated per MAR. Pt placed on continuous pulse ox, tele monitoring with BP set to cycle. Pt resting in bed, side rails up x2, bed in lowest position with call light within reach. Pt denies further needs at this time, will continue to monitor.       Rosa Salazar RN  06/22/19 2053

## 2019-06-23 NOTE — ED PROVIDER NOTES
I personally evaluated and examined the patient in conjunction with the APC and agree with the assessment, treatment plan and disposition of the patient has recorded by the APC. I reviewed pertinent nurse's notes, triage notes, vital signs, past medical history, family and social history, medications, and allergies. Complete review of systems was conducted by the mid-level provider and/or myself. Review of systems is negative except as documented in the history of present illness. This patient recently had a cholecystectomy. She does have some generalized abdominal tenderness on examination. I have reviewed her blood pressures and they are a little low but review of the record shows that she is always a little bit low. She got some fluid here in the department. Rest of her labs are normal including her hemoglobin is she will be discharged home    EKG: Normal sinus rhythm with no ST elevation or depression    FINAL IMPRESSION     1. Syncope and collapse    2. Dizziness            Electronically signed by: IVONNE Nova MD  06/23/19 3877

## 2019-06-24 VITALS
BODY MASS INDEX: 24.8 KG/M2 | DIASTOLIC BLOOD PRESSURE: 53 MMHG | WEIGHT: 140 LBS | HEART RATE: 50 BPM | OXYGEN SATURATION: 97 % | RESPIRATION RATE: 18 BRPM | SYSTOLIC BLOOD PRESSURE: 95 MMHG | TEMPERATURE: 97 F | HEIGHT: 63 IN

## 2019-06-24 RX ORDER — TRAMADOL HYDROCHLORIDE 50 MG/1
50 TABLET ORAL EVERY 4 HOURS PRN
Qty: 18 TABLET | Refills: 0 | Status: SHIPPED | OUTPATIENT
Start: 2019-06-24 | End: 2019-06-27

## 2019-06-24 NOTE — ED PROVIDER NOTES
This patient was seen at [facility] with Dr. Maria R Ames. Actually saw the patient yesterday as well. He saw her in touch base with Dr. Ysabel Salas in order to CT scan of the abdomen and pelvis that did not show any acute process. Dr. Ysabel Salas wanted a HIDA scan as well which was negative and did not show any bile leak. The patient is having some postoperative abdominal pain that was managed well with the morphine here in the emergency department. I went back and saw her before leaving and she did not have any pain. Vital signs are stable although she is a little hypotensive but she was yesterday as well. She does not appear to be toxic or septic at all. She does not have leukocytosis or fever. She is not having any pain now. I have advised her not to take the Percocet because she said that it made her pass out yesterday and this will brought her to the emergency department.   I am giving her some Ultram and she should avoid the Percocet and follow-up with Dr. Lorenzo Gaona MD  06/24/19 6766

## 2020-07-11 ENCOUNTER — HOSPITAL ENCOUNTER (EMERGENCY)
Age: 45
Discharge: HOME | End: 2020-07-12

## 2020-07-11 PROCEDURE — 99284 EMERGENCY DEPT VISIT MOD MDM: CPT

## 2020-07-11 ASSESSMENT — PAIN SCALES - GENERAL: PAINLEVEL_OUTOF10: 2

## 2020-07-12 ENCOUNTER — APPOINTMENT (OUTPATIENT)
Dept: ULTRASOUND IMAGING | Age: 45
End: 2020-07-12

## 2020-07-12 VITALS
HEIGHT: 60 IN | WEIGHT: 136.04 LBS | SYSTOLIC BLOOD PRESSURE: 100 MMHG | HEART RATE: 64 BPM | TEMPERATURE: 98.2 F | OXYGEN SATURATION: 99 % | RESPIRATION RATE: 14 BRPM | DIASTOLIC BLOOD PRESSURE: 50 MMHG | BODY MASS INDEX: 26.71 KG/M2

## 2020-07-12 LAB
A/G RATIO: 1.1 (ref 1.1–2.2)
ABO/RH: NORMAL
ALBUMIN SERPL-MCNC: 3.9 G/DL (ref 3.4–5)
ALP BLD-CCNC: 97 U/L (ref 40–129)
ALT SERPL-CCNC: 12 U/L (ref 10–40)
ANION GAP SERPL CALCULATED.3IONS-SCNC: 11 MMOL/L (ref 3–16)
AST SERPL-CCNC: 29 U/L (ref 15–37)
BASOPHILS ABSOLUTE: 0.1 K/UL (ref 0–0.2)
BASOPHILS RELATIVE PERCENT: 0.7 %
BILIRUB SERPL-MCNC: <0.2 MG/DL (ref 0–1)
BILIRUBIN URINE: NEGATIVE
BLOOD, URINE: NEGATIVE
BUN BLDV-MCNC: 7 MG/DL (ref 7–20)
CALCIUM SERPL-MCNC: 9.5 MG/DL (ref 8.3–10.6)
CHLORIDE BLD-SCNC: 100 MMOL/L (ref 99–110)
CLARITY: ABNORMAL
CO2: 27 MMOL/L (ref 21–32)
COLOR: YELLOW
CREAT SERPL-MCNC: 0.6 MG/DL (ref 0.6–1.1)
EOSINOPHILS ABSOLUTE: 0 K/UL (ref 0–0.6)
EOSINOPHILS RELATIVE PERCENT: 0.4 %
EPITHELIAL CELLS, UA: 1 /HPF (ref 0–5)
GFR AFRICAN AMERICAN: >60
GFR NON-AFRICAN AMERICAN: >60
GLOBULIN: 3.4 G/DL
GLUCOSE BLD-MCNC: 93 MG/DL (ref 70–99)
GLUCOSE URINE: NEGATIVE MG/DL
GONADOTROPIN, CHORIONIC (HCG) QUANT: NORMAL MIU/ML
HCT VFR BLD CALC: 37.2 % (ref 36–48)
HEMOGLOBIN: 12.3 G/DL (ref 12–16)
HYALINE CASTS: 0 /LPF (ref 0–8)
KETONES, URINE: NEGATIVE MG/DL
LEUKOCYTE ESTERASE, URINE: NEGATIVE
LIPASE: 46 U/L (ref 13–60)
LYMPHOCYTES ABSOLUTE: 2.2 K/UL (ref 1–5.1)
LYMPHOCYTES RELATIVE PERCENT: 21.1 %
MCH RBC QN AUTO: 29 PG (ref 26–34)
MCHC RBC AUTO-ENTMCNC: 32.9 G/DL (ref 31–36)
MCV RBC AUTO: 88.2 FL (ref 80–100)
MICROSCOPIC EXAMINATION: YES
MONOCYTES ABSOLUTE: 0.7 K/UL (ref 0–1.3)
MONOCYTES RELATIVE PERCENT: 7 %
NEUTROPHILS ABSOLUTE: 7.5 K/UL (ref 1.7–7.7)
NEUTROPHILS RELATIVE PERCENT: 70.8 %
NITRITE, URINE: NEGATIVE
PDW BLD-RTO: 13.2 % (ref 12.4–15.4)
PH UA: 7 (ref 5–8)
PLATELET # BLD: 150 K/UL (ref 135–450)
PMV BLD AUTO: 10.3 FL (ref 5–10.5)
POTASSIUM REFLEX MAGNESIUM: 4.1 MMOL/L (ref 3.5–5.1)
PROTEIN UA: NEGATIVE MG/DL
RBC # BLD: 4.22 M/UL (ref 4–5.2)
RBC UA: 2 /HPF (ref 0–4)
SODIUM BLD-SCNC: 138 MMOL/L (ref 136–145)
SPECIFIC GRAVITY UA: 1.02 (ref 1–1.03)
TOTAL PROTEIN: 7.3 G/DL (ref 6.4–8.2)
URINE REFLEX TO CULTURE: ABNORMAL
URINE TYPE: ABNORMAL
UROBILINOGEN, URINE: 0.2 E.U./DL
WBC # BLD: 10.6 K/UL (ref 4–11)
WBC UA: 1 /HPF (ref 0–5)

## 2020-07-12 PROCEDURE — 86900 BLOOD TYPING SEROLOGIC ABO: CPT

## 2020-07-12 PROCEDURE — 76817 TRANSVAGINAL US OBSTETRIC: CPT

## 2020-07-12 PROCEDURE — 86901 BLOOD TYPING SEROLOGIC RH(D): CPT

## 2020-07-12 PROCEDURE — 84702 CHORIONIC GONADOTROPIN TEST: CPT

## 2020-07-12 PROCEDURE — 81001 URINALYSIS AUTO W/SCOPE: CPT

## 2020-07-12 PROCEDURE — 80053 COMPREHEN METABOLIC PANEL: CPT

## 2020-07-12 PROCEDURE — 83690 ASSAY OF LIPASE: CPT

## 2020-07-12 PROCEDURE — 85025 COMPLETE CBC W/AUTO DIFF WBC: CPT

## 2020-07-12 RX ORDER — VITAMIN A, VITAMIN C, VITAMIN D-3, VITAMIN E, VITAMIN B-1, VITAMIN B-2, NIACIN, VITAMIN B-6, CALCIUM, IRON, ZINC, COPPER 4000; 120; 400; 22; 1.84; 3; 20; 10; 1; 12; 200; 27; 25; 2 [IU]/1; MG/1; [IU]/1; MG/1; MG/1; MG/1; MG/1; MG/1; MG/1; UG/1; MG/1; MG/1; MG/1; MG/1
1 TABLET ORAL DAILY
Qty: 30 TABLET | Refills: 3 | Status: SHIPPED | OUTPATIENT
Start: 2020-07-12 | End: 2020-08-17 | Stop reason: HOSPADM

## 2020-07-12 ASSESSMENT — ENCOUNTER SYMPTOMS
SORE THROAT: 0
VOMITING: 0
SHORTNESS OF BREATH: 0
NAUSEA: 0
RHINORRHEA: 0
CONSTIPATION: 0
BLOOD IN STOOL: 0
ABDOMINAL PAIN: 1
DIARRHEA: 0

## 2020-07-12 NOTE — ED PROVIDER NOTES
[address] ENCOUNTER        Pt Name: Rosaline Rod  MRN: [id number]  [place] 1975  Date of evaluation: 7/11/2020  Provider: GURDEEP Lozano CNP  PCP: Referring Not In System (Inactive)    This patient was not seen and evaluated by the attending physician No att. providers found. CHIEF COMPLAINT       Chief Complaint   Patient presents with    Pregnancy Problem     Pt presents to the ED from home with abdominal cramping, pt not sure how far along she is . Pt states that she is having brow discharge, denies bleeding or any spotting        HISTORY OF PRESENT ILLNESS   (Location/Symptom, Timing/Onset,Context/Setting, Quality, Duration, Modifying Factors, Severity)  Note limiting factors. Rosaline Rod is a 45 y.o. female who presents emergency department with concern for possible pregnancy. She reports LMP 5/28/2020. She is having \"symptoms of pregnancy. \"  She took a pregnancy test at home which was positive. G 10 P9. She does report some abdominal cramping with no vaginal bleeding. She also denies fever, rash, headaches, dizziness, chest pain, shortness of breath, cough, congestion, nausea, vomiting, diarrhea, constipation, blood in the stool, or painful urination. Family at bedside. Nursing Notes triage note reviewed and agreed with or any disagreements were addressed  in the HPI. REVIEW OF SYSTEMS    (2-9 systems for level 4, 10 or more for level 5)     Review of Systems   Constitutional: Negative for chills and fever. HENT: Negative for postnasal drip, rhinorrhea and sore throat. Eyes: Negative for visual disturbance. Respiratory: Negative for shortness of breath. Cardiovascular: Negative for chest pain. Gastrointestinal: Positive for abdominal pain. Negative for blood in stool, constipation, diarrhea, nausea and vomiting.    Genitourinary: Negative for dysuria, flank pain and club or organization: None     Attends meetings of clubs or organizations: None     Relationship status: None    Intimate partner violence     Fear of current or ex partner: None     Emotionally abused: None     Physically abused: None     Forced sexual activity: None   Other Topics Concern    None   Social History Narrative    None       SCREENINGS             PHYSICAL EXAM  (up to 7 for level 4, 8 or more for level 5)     ED Triage Vitals   BP Temp Temp Source Pulse Resp SpO2 Height Weight   07/11/20 2227 07/11/20 2227 07/11/20 2227 07/11/20 2227 07/11/20 2227 07/11/20 2227 07/11/20 2225 07/11/20 2227   116/74 98.2 °F (36.8 °C) Oral 72 12 99 % 5' (1.524 m) 136 lb 0.6 oz (61.7 kg)       Physical Exam  Vitals signs and nursing note reviewed. Constitutional:       Appearance: She is well-developed. She is not diaphoretic. HENT:      Head: Normocephalic and atraumatic. Eyes:      General: No scleral icterus. Right eye: No discharge. Left eye: No discharge. Neck:      Musculoskeletal: Normal range of motion and neck supple. Cardiovascular:      Rate and Rhythm: Normal rate and regular rhythm. Heart sounds: Normal heart sounds. No murmur. No friction rub. No gallop. Pulmonary:      Effort: Pulmonary effort is normal. No respiratory distress. Breath sounds: Normal breath sounds. No stridor. No wheezing or rales. Chest:      Chest wall: No tenderness. Abdominal:      General: Bowel sounds are normal. There is no distension. Palpations: Abdomen is soft. There is no mass. Tenderness: There is no abdominal tenderness. There is no guarding or rebound. Genitourinary:     Comments: deferred  Musculoskeletal: Normal range of motion. General: No tenderness. Skin:     General: Skin is warm and dry. Coloration: Skin is not pale. Neurological:      Mental Status: She is alert and oriented to person, place, and time.       Coordination: Coordination normal. date of 2021. Normal right ovary. Nonvisualized left ovary. No results found. PROCEDURES   Unless otherwise noted below, none     Procedures    CRITICAL CARE TIME     There was a high probability of life-threatening clinical deterioration in the patient's condition requiring my urgent intervention. The total critical care time spent while evaluating and treating this patient was at least 0 minutes. This excludes time spent doing separately billable procedures. This includes time at the bedside, data interpretation, medication management, obtaining critical history from collateral sources if the patient is unable to provide it directly, and physician consultation. Specifics of interventions taken and potentially life-threatening diagnostic considerations are listed in the medical decision making. CONSULTS:  None      EMERGENCY DEPARTMENT COURSE and DIFFERENTIAL DIAGNOSIS/MDM:   Vitals:    Vitals:    20 2225 20 2227 20 0245 20 0300   BP:  116/74 (!) 98/54 (!) 100/50   Pulse:  72 64    Resp:  12 14    Temp:  98.2 °F (36.8 °C)     TempSrc:  Oral     SpO2:  99% 97% 99%   Weight:  136 lb 0.6 oz (61.7 kg)     Height: 5' (1.524 m)          Dayday Muse was given the following medications:  Medications - No data to display    Dayday Muse was evaluated in the emergency department with concern for abdominal pain and pregnancy. No vaginal bleeding. She is identified to be pregnant today. Not requiring RhoGam injection. Ultrasound imaging confirms single live IUP. She will be started on prenatal vitamins and instructed to follow-up with OB. Suspicion is low for ectopic pregnancy, help syndrome, appendicitis, PE,  labor, or pericarditis. Dayday Muse is stable in the ER and safe to follow as an outpatient. The patient is discharged on the following medications.   They were counseled on how to take the newly prescribed medications:  New Prescriptions    PRENATAL VIT-FE FUMARATE-FA (PRENATAL VITAMIN PLUS LOW IRON) 27-1 MG TABS    Take 1 tablet by mouth daily    . Instructed to follow-up with:  [address]  [address]  Schedule an appointment as soon as possible for a visit in 3 days  for an appointment    Return to the ER for new or worsening symptoms. I evaluated the patient. The physician was available for consultation as needed. The patient and / or the family were informed of the results of any tests, a time was given to answer questions, a plan was proposed and they agreed with plan. FINAL IMPRESSION      1. Abdominal pain affecting pregnancy    2.  Multigravida of advanced maternal age in first trimester          DISPOSITION/PLAN   DISPOSITION Decision To Discharge 07/12/2020 03:19:50 AM        DISCONTINUED MEDICATIONS:  Discontinued Medications    No medications on file                (Please note that portions of this note were completed with a voice recognition program.  Efforts were made to edit the dictations but occasionally words are mis-transcribed.)    Cecille Lefort, APRN - CNP (electronically signed)        Cecille Lefort, APRN - CNP  07/12/20 5329

## 2020-08-17 ENCOUNTER — HOSPITAL ENCOUNTER (EMERGENCY)
Age: 45
Discharge: HOME | End: 2020-08-17
Attending: EMERGENCY MEDICINE

## 2020-08-17 ENCOUNTER — APPOINTMENT (OUTPATIENT)
Dept: ULTRASOUND IMAGING | Age: 45
End: 2020-08-17

## 2020-08-17 VITALS
TEMPERATURE: 98.1 F | DIASTOLIC BLOOD PRESSURE: 64 MMHG | RESPIRATION RATE: 16 BRPM | BODY MASS INDEX: 23.44 KG/M2 | SYSTOLIC BLOOD PRESSURE: 109 MMHG | HEART RATE: 68 BPM | OXYGEN SATURATION: 100 % | WEIGHT: 120 LBS

## 2020-08-17 LAB
A/G RATIO: 1.2 (ref 1.1–2.2)
ABO/RH: NORMAL
ALBUMIN SERPL-MCNC: 4.4 G/DL (ref 3.4–5)
ALP BLD-CCNC: 127 U/L (ref 40–129)
ALT SERPL-CCNC: 30 U/L (ref 10–40)
ANION GAP SERPL CALCULATED.3IONS-SCNC: 11 MMOL/L (ref 3–16)
AST SERPL-CCNC: 42 U/L (ref 15–37)
BACTERIA WET PREP: ABNORMAL
BASOPHILS ABSOLUTE: 0 K/UL (ref 0–0.2)
BASOPHILS RELATIVE PERCENT: 0.3 %
BILIRUB SERPL-MCNC: 0.3 MG/DL (ref 0–1)
BILIRUBIN URINE: NEGATIVE
BLOOD, URINE: ABNORMAL
BUN BLDV-MCNC: 7 MG/DL (ref 7–20)
CALCIUM SERPL-MCNC: 9.7 MG/DL (ref 8.3–10.6)
CHLORIDE BLD-SCNC: 104 MMOL/L (ref 99–110)
CLARITY: ABNORMAL
CLUE CELLS: ABNORMAL
CO2: 23 MMOL/L (ref 21–32)
COLOR: ABNORMAL
CREAT SERPL-MCNC: 0.5 MG/DL (ref 0.6–1.1)
EOSINOPHILS ABSOLUTE: 0 K/UL (ref 0–0.6)
EOSINOPHILS RELATIVE PERCENT: 0.3 %
EPITHELIAL CELLS WET PREP: ABNORMAL
EPITHELIAL CELLS, UA: ABNORMAL /HPF (ref 0–5)
GFR AFRICAN AMERICAN: >60
GFR NON-AFRICAN AMERICAN: >60
GLOBULIN: 3.8 G/DL
GLUCOSE BLD-MCNC: 94 MG/DL (ref 70–99)
GLUCOSE URINE: NEGATIVE MG/DL
GONADOTROPIN, CHORIONIC (HCG) QUANT: NORMAL MIU/ML
HCT VFR BLD CALC: 42.9 % (ref 36–48)
HEMOGLOBIN: 14.1 G/DL (ref 12–16)
KETONES, URINE: ABNORMAL MG/DL
LEUKOCYTE ESTERASE, URINE: ABNORMAL
LYMPHOCYTES ABSOLUTE: 1.8 K/UL (ref 1–5.1)
LYMPHOCYTES RELATIVE PERCENT: 21.9 %
MCH RBC QN AUTO: 29.7 PG (ref 26–34)
MCHC RBC AUTO-ENTMCNC: 32.8 G/DL (ref 31–36)
MCV RBC AUTO: 90.6 FL (ref 80–100)
MICROSCOPIC EXAMINATION: YES
MONOCYTES ABSOLUTE: 0.5 K/UL (ref 0–1.3)
MONOCYTES RELATIVE PERCENT: 5.5 %
NEUTROPHILS ABSOLUTE: 5.9 K/UL (ref 1.7–7.7)
NEUTROPHILS RELATIVE PERCENT: 72 %
NITRITE, URINE: NEGATIVE
PDW BLD-RTO: 13.6 % (ref 12.4–15.4)
PH UA: 6 (ref 5–8)
PLATELET # BLD: 144 K/UL (ref 135–450)
PMV BLD AUTO: 10.1 FL (ref 5–10.5)
POTASSIUM REFLEX MAGNESIUM: 3.6 MMOL/L (ref 3.5–5.1)
PROTEIN UA: 100 MG/DL
RBC # BLD: 4.73 M/UL (ref 4–5.2)
RBC UA: >100 /HPF (ref 0–4)
RBC WET PREP: ABNORMAL
SODIUM BLD-SCNC: 138 MMOL/L (ref 136–145)
SOURCE WET PREP: ABNORMAL
SPECIFIC GRAVITY UA: 1.02 (ref 1–1.03)
TOTAL PROTEIN: 8.2 G/DL (ref 6.4–8.2)
TRICHOMONAS PREP: ABNORMAL
URINE REFLEX TO CULTURE: ABNORMAL
URINE TYPE: ABNORMAL
UROBILINOGEN, URINE: 1 E.U./DL
WBC # BLD: 8.2 K/UL (ref 4–11)
WBC UA: ABNORMAL /HPF (ref 0–5)
WBC WET PREP: ABNORMAL
YEAST WET PREP: ABNORMAL

## 2020-08-17 PROCEDURE — 86901 BLOOD TYPING SEROLOGIC RH(D): CPT

## 2020-08-17 PROCEDURE — 87591 N.GONORRHOEAE DNA AMP PROB: CPT

## 2020-08-17 PROCEDURE — 80053 COMPREHEN METABOLIC PANEL: CPT

## 2020-08-17 PROCEDURE — 86900 BLOOD TYPING SEROLOGIC ABO: CPT

## 2020-08-17 PROCEDURE — 87491 CHLMYD TRACH DNA AMP PROBE: CPT

## 2020-08-17 PROCEDURE — 2580000003 HC RX 258: Performed by: PHYSICIAN ASSISTANT

## 2020-08-17 PROCEDURE — 99284 EMERGENCY DEPT VISIT MOD MDM: CPT

## 2020-08-17 PROCEDURE — 81001 URINALYSIS AUTO W/SCOPE: CPT

## 2020-08-17 PROCEDURE — 87210 SMEAR WET MOUNT SALINE/INK: CPT

## 2020-08-17 PROCEDURE — 76817 TRANSVAGINAL US OBSTETRIC: CPT

## 2020-08-17 PROCEDURE — 85025 COMPLETE CBC W/AUTO DIFF WBC: CPT

## 2020-08-17 PROCEDURE — 84702 CHORIONIC GONADOTROPIN TEST: CPT

## 2020-08-17 RX ORDER — 0.9 % SODIUM CHLORIDE 0.9 %
1000 INTRAVENOUS SOLUTION INTRAVENOUS ONCE
Status: COMPLETED | OUTPATIENT
Start: 2020-08-17 | End: 2020-08-17

## 2020-08-17 RX ORDER — MISOPROSTOL 100 UG/1
200 TABLET ORAL ONCE
Qty: 4 TABLET | Refills: 0 | Status: SHIPPED | OUTPATIENT
Start: 2020-08-17 | End: 2020-08-17

## 2020-08-17 RX ADMIN — SODIUM CHLORIDE 1000 ML: 9 INJECTION, SOLUTION INTRAVENOUS at 14:33

## 2020-08-17 ASSESSMENT — ENCOUNTER SYMPTOMS
CHEST TIGHTNESS: 0
NAUSEA: 0
BACK PAIN: 0
ABDOMINAL PAIN: 0
VOMITING: 0
SHORTNESS OF BREATH: 0
DIARRHEA: 0

## 2020-08-17 NOTE — ED NOTES
Bed: 18  Expected date:   Expected time:   Means of arrival:   Comments:  [id number], RN  08/17/20 2744

## 2020-08-17 NOTE — ED PROVIDER NOTES
[address] ENCOUNTER        Pt Name: Brianna Yip  MRN: [id number]  [place] 1975  Date of evaluation: 8/17/2020  Provider: Kateryna Muse PA-C  PCP: Referring Not In System (Inactive)     I have seen and evaluated this patient with my supervising physician Mcihelle Mccoy MD.    [address] [place] [address]       Chief Complaint   Patient presents with    Vaginal Bleeding     pt states she has been having brown d/c and vag bleeding. Approx 12 wks preg. HISTORY OF PRESENT ILLNESS   (Location, Timing/Onset, Context/Setting, Quality, Duration, Modifying Factors, Severity, Associated Signs and Symptoms)  Note limiting factors. Brianna Yip is a 45 y.o. female G 8 P9 presents the emergency department with difficulties as a pertains to vaginal bleeding. Patient confirms that she is approximately 12 weeks pregnant. She states she has had a few days of spotting. Is reported this morning she had more mina vaginal bleeding. She states that she is not having much in the way of significant abdominal pain or discomfort. Patient is on to report that she has not seen anyone yet for this pregnancy. Her last pregnancy was 2 years ago when she states that she delivered here at [facility]. Patient states she is not currently experiencing any significant discomfort. She denies that she is experiencing chest pain, palpitations, lightheadedness or shortness of breath. She has no additional GI  or gynecologic complaints at present. Nursing Notes were all reviewed and agreed with or any disagreements were addressed in the HPI. REVIEW OF SYSTEMS    (2-9 systems for level 4, 10 or more for level 5)     Review of Systems   Constitutional: Negative for activity change, chills and fever. Respiratory: Negative for chest tightness and shortness of breath. Cardiovascular: Negative for chest pain.    Gastrointestinal: Negative for abdominal pain, diarrhea, nausea and vomiting. Genitourinary: Positive for menstrual problem and vaginal bleeding. Negative for dysuria and flank pain. Musculoskeletal: Negative for back pain and myalgias. Skin: Negative for rash and wound. Neurological: Negative for seizures, syncope and headaches. Positives and Pertinent negatives as per HPI. Except as noted above in the ROS, all other systems were reviewed and negative. PAST MEDICAL HISTORY     Past Medical History:   Diagnosis Date    AMA (advanced maternal age) multigravida 35+     Anemia     Pt taking PO Iron.  SGA (small for gestational age)     (prior pregnancy); EFW 2473g on 4/3/18.  18%. SURGICAL HISTORY     Past Surgical History:   Procedure Laterality Date    CHOLECYSTECTOMY, LAPAROSCOPIC N/A 6/21/2019    LAPAROSCOPIC CHOLECYSTECTOMY WITH INTRAOPERATIVE CHOLANGIOGRAM performed by Eden Orozco MD at [address] [place] [address]       Previous Medications    ACETAMINOPHEN (TYLENOL) 500 MG TABLET    Take 2 tablets by mouth 3 times daily as needed for Pain    PRENATAL VIT-FE FUMARATE-FA (PRENATAL VITAMIN PLUS LOW IRON) 27-1 MG TABS    Take 1 tablet by mouth daily         ALLERGIES     Patient has no known allergies. FAMILYHISTORY     History reviewed. No pertinent family history. SOCIAL HISTORY       Social History     Tobacco Use    Smoking status: Never Smoker    Smokeless tobacco: Never Used   Substance Use Topics    Alcohol use: No     Alcohol/week: 0.0 standard drinks    Drug use: No       SCREENINGS             PHYSICAL EXAM    (up to 7 for level 4, 8 or more for level 5)     ED Triage Vitals [08/17/20 1056]   BP Temp Temp Source Pulse Resp SpO2 Height Weight   103/63 98.1 °F (36.7 °C) Temporal 73 16 100 % -- 120 lb (54.4 kg)       Physical Exam  Vitals signs and nursing note reviewed. Constitutional:       General: She is awake. She is not in acute distress.      Appearance: Normal for the following components:       Result Value    Clue Cells, Wet Prep <1+ (*)     All other components within normal limits    Narrative:     Performed at:  [facility] [address]   Phone [phone]   COMPREHENSIVE METABOLIC PANEL W/ REFLEX TO MG FOR LOW K - Abnormal; Notable for the following components:    CREATININE 0.5 (*)     AST 42 (*)     All other components within normal limits    Narrative:     Performed at:  [facility] [address]   Phone [phone]   URINE RT REFLEX TO CULTURE - Abnormal; Notable for the following components:    Color, UA RED (*)     Clarity, UA CLOUDY (*)     Ketones, Urine TRACE (*)     Blood, Urine LARGE (*)     Protein,  (*)     Leukocyte Esterase, Urine MODERATE (*)     All other components within normal limits    Narrative:     Performed at:  [facility] [address]   Phone [phone]   MICROSCOPIC URINALYSIS - Abnormal; Notable for the following components:    RBC, UA >100 (*)     Epithelial Cells, UA 6-10 (*)     All other components within normal limits    Narrative:     Performed at:  [facility] [address]   Phone [phone] DNA   CBC WITH AUTO DIFFERENTIAL    Narrative:     Performed at:  [facility] [address]   Phone [phone]   HCG, QUANTITATIVE, PREGNANCY    Narrative:     Performed at:  [facility] [address]   Phone [phone]   ABO/RH    Narrative:     Performed at:  [facility] [address]   Phone [phone]       All other labs were within normal range or not returned as of this dictation. EKG: All EKG's are interpreted by the Emergency Department Physician in the absence of a cardiologist.  Please see their note for interpretation of EKG. RADIOLOGY:   Non-plain film images such as CT, Ultrasound and MRI are read by the radiologist. Plain radiographic images are visualized and preliminarily interpreted by the ED Provider with the below findings:        Interpretation per the Radiologist below, if available at the time of this note:    US OB TRANSVAGINAL   Preliminary Result   Abnormal appearing gestational sac with fetal pole and no evidence of cardiac   activity. Estimated gestational age is 6 weeks and 3 days. Findings most compatible   with fetal demise, incomplete . Early intrauterine pregnancy with   nonvisualization of cardiac activity felt to be less likely. Please   correlate with quantitative beta HCG level if available. Follow-up can be   obtained as clinically indicated. Right ovary appears unremarkable. Left ovary is not visualized. PROCEDURES   Unless otherwise noted below, none     Procedures    CRITICAL CARE TIME   N/A    CONSULTS:  IP CONSULT TO OB GYN      EMERGENCY DEPARTMENT COURSE and DIFFERENTIAL DIAGNOSIS/MDM:   Vitals:    Vitals:    20 1500 20 1515 20 1530 20 1640   BP: (!) 97/45  (!) 95/45 109/64   Pulse:   69 68   Resp:   16 16   Temp:       TempSrc:       SpO2: 100% 100% 100% 100%   Weight:           Patient was given the following medications:  Medications   0.9 % sodium chloride bolus (1,000 mLs Intravenous New Bag 20 1433)           The patient's detailed history of present illness is documented as above. Upon arrival to the emergency department the patient's vital signs are as documented. The patient is noted to be hemodynamically stable and afebrile. Physical examination findings are as above. The axis was obtained. Laboratory testing and work-up was initiated.   CBC demonstrates no evidence of leukocytosis anemia and/or thrombocytopenia. BUN is 7 creatinine is 0.5 nonfasting glucose 98 electrolytes and LFTs unremarkable. Quantitative hCG is 23,049. ABO Rh is A+. Urine demonstrates evidence of blood which is clinically evident on her examination and demonstrates moderate leukocytes and will need to have treatment even though this appears to be most consistent with asymptomatic bacteriuria in pregnancy. .  She does have bacterial vaginosis which will require treatment. Obstetric ultrasound demonstrates an abnormal appearing gestational sac with a fetal pole and no evidence of cardiac activity. Dates do not correspond to the patient's report of 12 weeks gestation when she is estimated on ultrasound to be 6 and 3. Fetal demise versus incomplete. Early intrauterine pregnancy with nonvisualization of the cardiac cavity is thought to be much less likely. Right ovary is unremarkable and finding the left ovary is not able to be visualized. The above-mentioned telephone call was placed to obstetrics. The case was discussed directly with call. Dr. Teodora Farris did come to the emergency department and see and evaluate this patient. Following the lengthy discussion it was determined that they were going to had utilize Cytotec and the patient would follow in the clinic on Friday for ongoing care management. Dr. Teodora Farris states there is nothing further here in the emergency department setting that we need to do for the patient and appropriate arrangements for discharge to home was completed. FINAL IMPRESSION      1.  Missed           DISPOSITION/PLAN   DISPOSITION: Discharged to home      PATIENT REFERREDTO:    As arranged you will follow in the clinic on Friday with Dr. Lyndsay Pelayo Emergency Department  [address] [place] [address]  [phone]    If symptoms worsen         (Please note that portions of this note were completed with a voice recognition program. Efforts were made to edit the dictations but occasionally words are mis-transcribed.)    Lelia Whitaker PA-C (electronically signed)           Aleks Medel PA-C  08/17/20 2334

## 2020-08-17 NOTE — ED NOTES
Pt discharged in stable condition, VSS, no signs of distress, discharge instructions and meds reviewed. Pt verbalizes understanding or concerns unaddressed.         Karen Lopez RN  08/17/20 7378

## 2020-08-18 LAB
C. TRACHOMATIS DNA ,URINE: NEGATIVE
N. GONORRHOEAE DNA, URINE: NEGATIVE

## 2020-08-20 ENCOUNTER — ROUTINE PRENATAL (OUTPATIENT)
Dept: OBGYN | Age: 45
End: 2020-08-20

## 2020-08-20 VITALS — TEMPERATURE: 97.6 F | SYSTOLIC BLOOD PRESSURE: 128 MMHG | DIASTOLIC BLOOD PRESSURE: 70 MMHG | RESPIRATION RATE: 18 BRPM

## 2020-08-20 LAB — GONADOTROPIN, CHORIONIC (HCG) QUANT: 2071 MIU/ML

## 2020-08-20 PROCEDURE — 84702 CHORIONIC GONADOTROPIN TEST: CPT

## 2020-08-20 PROCEDURE — 99202 OFFICE O/P NEW SF 15 MIN: CPT | Performed by: OBSTETRICS & GYNECOLOGY

## 2020-08-20 NOTE — PROGRESS NOTES
Patient seen in ER on 7/12/20 +pregnancy test. Seen in ER again on 8/17/20 with bleeding & cramping, b-hcg decreased. Denies heavy bleeding or pain today, light pink bleeding today.  Beta-HCG drawn & sent

## 2020-08-20 NOTE — ED PROVIDER NOTES
As physician-in-triage, I performed a medical screening history and physical exam on [facility]. I also cared for and evaluated the patient in conjunction with the ED Advanced Practice Provider. All diagnostic, treatment, and disposition decisions were made by myself in conjunction with the advanced practice provider. For all further details of the patient's emergency department visit, please see the advanced practice provider's documentation. Patient presents the ER for evaluation of persistent vaginal bleeding, missed AB AB+ beta-hCG of 23,049. Dr. Wes Gibson did exam the patient in the emergency department she will follow outpatient end with treatment of underlying Cytotec, she was seen by the [facility] attending, and further management per the [facility] attending. No gross distention, mild vaginal bleeding.       Impression: Missed      Linwood Segovia MD   8701

## 2020-08-27 ENCOUNTER — HOSPITAL ENCOUNTER (OUTPATIENT)
Age: 45
Discharge: HOME | End: 2020-08-27

## 2020-08-27 LAB — GONADOTROPIN, CHORIONIC (HCG) QUANT: 95.1 MIU/ML

## 2020-08-27 PROCEDURE — 84702 CHORIONIC GONADOTROPIN TEST: CPT

## 2020-08-27 PROCEDURE — 36415 COLL VENOUS BLD VENIPUNCTURE: CPT

## 2021-01-07 NOTE — PROGRESS NOTES
No c/o, VS reviewed.  NST today Glycopyrrolate Counseling:  I discussed with the patient the risks of glycopyrrolate including but not limited to skin rash, drowsiness, dry mouth, difficulty urinating, and blurred vision.

## (undated) DEVICE — TROCAR ENDOSCP L100MM DIA5MM BLDELSS STBL SL OBT RADLUC

## (undated) DEVICE — CONTROL SYRINGE LUER-LOCK TIP: Brand: MONOJECT

## (undated) DEVICE — SOLUTION IV IRRIG POUR BRL 0.9% SODIUM CHL 2F7124

## (undated) DEVICE — CATHETER CHOLANGIOGRAM 4.5 FRX3 IN W/ 20018M55 TAUT INTRO

## (undated) DEVICE — CHLORAPREP 26ML ORANGE

## (undated) DEVICE — DRAPE C ARM UNIV W41XL74IN CLR PLAS XR VELC CLSR POLY STRP

## (undated) DEVICE — LAPAROSCOPY PK

## (undated) DEVICE — SUTURE VCRL SZ 4-0 L18IN ABSRB UD L19MM PS-2 3/8 CIR PRIM J496H

## (undated) DEVICE — SUTURE VCRL SZ 0 L27IN ABSRB VLT L22MM CT-3 1/2 CIR J329H

## (undated) DEVICE — SKIN AFFIX SURG ADHESIVE 72/CS 0.55ML: Brand: MEDLINE

## (undated) DEVICE — CATHETER CHOLGM 4.5FR L18IN W/ MTL SUPP TB

## (undated) DEVICE — PLUMEPORT LAPAROSCOPIC SMOKE FILTRATION DEVICE: Brand: PLUMEPORT ACTIV

## (undated) DEVICE — SYRINGE, LUER LOCK, 10ML: Brand: MEDLINE

## (undated) DEVICE — NEEDLE HYPO 22GA L1.5IN BLK POLYPR HUB S STL REG BVL STR

## (undated) DEVICE — APPLIER CLP M/L SHFT DIA5MM 15 LIG LIGAMAX 5

## (undated) DEVICE — Device

## (undated) DEVICE — LAPAROSCOPIC SCISSORS: Brand: EPIX LAPAROSCOPIC SCISSORS

## (undated) DEVICE — CORD ES L10FT MPLR LAP

## (undated) DEVICE — STERILE POLYISOPRENE POWDER-FREE SURGICAL GLOVES: Brand: PROTEXIS

## (undated) DEVICE — DRAPE,LAP,CHOLE,W/TROUGHS,STERILE: Brand: MEDLINE

## (undated) DEVICE — TISSUE RETRIEVAL SYSTEM: Brand: INZII RETRIEVAL SYSTEM

## (undated) DEVICE — 35 ML SYRINGE LUER-LOCK TIP: Brand: MONOJECT

## (undated) DEVICE — SET EXTN PRIMING 4.9ML L30IN INCL SLDE CLMP SPIN M LUERLOCK

## (undated) DEVICE — KIT OR ROOM TURNOVER W/STRAP

## (undated) DEVICE — NEEDLE INSUF L120MM DIA2MM DISP FOR PNEUMOPERI ENDOPATH